# Patient Record
Sex: MALE | Race: BLACK OR AFRICAN AMERICAN | NOT HISPANIC OR LATINO | Employment: FULL TIME | URBAN - METROPOLITAN AREA
[De-identification: names, ages, dates, MRNs, and addresses within clinical notes are randomized per-mention and may not be internally consistent; named-entity substitution may affect disease eponyms.]

---

## 2017-01-31 ENCOUNTER — HOSPITAL ENCOUNTER (EMERGENCY)
Facility: HOSPITAL | Age: 27
Discharge: HOME | End: 2017-01-31
Attending: EMERGENCY MEDICINE
Payer: MEDICAID

## 2017-01-31 VITALS
SYSTOLIC BLOOD PRESSURE: 160 MMHG | OXYGEN SATURATION: 98 % | WEIGHT: 215 LBS | BODY MASS INDEX: 28.49 KG/M2 | DIASTOLIC BLOOD PRESSURE: 76 MMHG | TEMPERATURE: 98 F | RESPIRATION RATE: 18 BRPM | HEART RATE: 91 BPM | HEIGHT: 73 IN

## 2017-01-31 DIAGNOSIS — L02.821: Primary | ICD-10-CM

## 2017-01-31 PROCEDURE — 99283 EMERGENCY DEPT VISIT LOW MDM: CPT | Mod: 25

## 2017-01-31 PROCEDURE — 63600175 PHARM REV CODE 636 W HCPCS: Performed by: NURSE PRACTITIONER

## 2017-01-31 PROCEDURE — 96372 THER/PROPH/DIAG INJ SC/IM: CPT

## 2017-01-31 PROCEDURE — 10060 I&D ABSCESS SIMPLE/SINGLE: CPT

## 2017-01-31 PROCEDURE — 25000003 PHARM REV CODE 250: Performed by: NURSE PRACTITIONER

## 2017-01-31 RX ORDER — ONDANSETRON 4 MG/1
4 TABLET, ORALLY DISINTEGRATING ORAL
Status: COMPLETED | OUTPATIENT
Start: 2017-01-31 | End: 2017-01-31

## 2017-01-31 RX ORDER — LIDOCAINE HYDROCHLORIDE AND EPINEPHRINE 10; 10 MG/ML; UG/ML
10 INJECTION, SOLUTION INFILTRATION; PERINEURAL
Status: COMPLETED | OUTPATIENT
Start: 2017-01-31 | End: 2017-01-31

## 2017-01-31 RX ORDER — SULFAMETHOXAZOLE AND TRIMETHOPRIM 800; 160 MG/1; MG/1
1 TABLET ORAL
Status: COMPLETED | OUTPATIENT
Start: 2017-01-31 | End: 2017-01-31

## 2017-01-31 RX ORDER — HYDROMORPHONE HYDROCHLORIDE 2 MG/ML
1 INJECTION, SOLUTION INTRAMUSCULAR; INTRAVENOUS; SUBCUTANEOUS
Status: COMPLETED | OUTPATIENT
Start: 2017-01-31 | End: 2017-01-31

## 2017-01-31 RX ORDER — SULFAMETHOXAZOLE AND TRIMETHOPRIM 800; 160 MG/1; MG/1
1 TABLET ORAL 2 TIMES DAILY
Qty: 14 TABLET | Refills: 0 | Status: SHIPPED | OUTPATIENT
Start: 2017-01-31 | End: 2017-02-07

## 2017-01-31 RX ADMIN — ONDANSETRON 4 MG: 4 TABLET, ORALLY DISINTEGRATING ORAL at 06:01

## 2017-01-31 RX ADMIN — LIDOCAINE HYDROCHLORIDE,EPINEPHRINE BITARTRATE 10 ML: 10; .01 INJECTION, SOLUTION INFILTRATION; PERINEURAL at 06:01

## 2017-01-31 RX ADMIN — SULFAMETHOXAZOLE AND TRIMETHOPRIM 1 TABLET: 800; 160 TABLET ORAL at 06:01

## 2017-01-31 RX ADMIN — HYDROMORPHONE HYDROCHLORIDE 1 MG: 2 INJECTION INTRAMUSCULAR; INTRAVENOUS; SUBCUTANEOUS at 06:01

## 2017-02-01 NOTE — DISCHARGE INSTRUCTIONS
Please keep the area clean and dry.  You can use antibacterial soap and rinse with water. Keep a clean gauze dressing over the area.    Take bactrim for 7 days.    Follow up with your primary care doctor or return to the ER in 2-3 days for wound check.    Return for any new or worsening symptoms or concerns.

## 2017-02-01 NOTE — ED PROVIDER NOTES
"Encounter Date: 1/31/2017    SCRIBE #1 NOTE: I, Stanley Squires, am scribing for, and in the presence of,  Alecia Guerrero NP. I have scribed the following portions of the note - Other sections scribed: HPI, ROS.       History     Chief Complaint   Patient presents with    Abscess     Pt. c/o abscess that began as a small cut from a box that "hit him on the head" on Friday.      Review of patient's allergies indicates:   Allergen Reactions    Animal derived oil      Dander      Pollen extracts      HPI Comments: CC: Abscess    27 y/o male with Bronchitis, peptic ulcer, and shingles presents to the ED c/o 4 day hx of acute onset abscess to posterior neck with associated pus discharge and pain. Pain is severe (10/10) and exacerbates with palpation and movement of neck and shoulders. Symptoms have progressively worsened. Pt reports that 4 days ago he noticed a bump to his posterior neck. Then, when he went to work that same day, pt reports that a dirty box braised the bump. Pt states that he has "sensitive skin." Pt reports last tetanus immunization was within the last yr. Pt also c/o subjective fever and generalized body aches. Pt denies N/V/D, abdominal pain, or cough. No attempted treatment reported. No alleviating factors or other symptoms reported.     The history is provided by the patient. No  was used.     Past Medical History   Diagnosis Date    Bronchitis     Knee contusion     Peptic ulcer     Shingles     Shingles      Past Medical History Pertinent Negatives   Diagnosis Date Noted    Asthma 12/9/2013    Depression 12/9/2013    Hypertension 12/9/2013     Past Surgical History   Procedure Laterality Date    Circumcision, primary       Family History   Problem Relation Age of Onset    Cancer Maternal Grandfather     Diabetes Paternal Grandfather     No Known Problems Mother     No Known Problems Father     Lupus Maternal Aunt     Diabetes Maternal Uncle      Social History "   Substance Use Topics    Smoking status: Never Smoker    Smokeless tobacco: Never Used    Alcohol use No     Review of Systems   Constitutional: Positive for fever (subjective). Negative for chills.        (+) generalized body aches   HENT: Negative for rhinorrhea.    Eyes: Negative for redness.   Respiratory: Negative for cough and shortness of breath.    Cardiovascular: Negative for chest pain.   Gastrointestinal: Negative for abdominal pain, diarrhea, nausea and vomiting.   Genitourinary: Negative for difficulty urinating and dysuria.   Musculoskeletal: Negative for gait problem.   Skin: Negative for rash.        (+) abscess to posterior neck with associated pus discharge and pain   Neurological: Negative for headaches.       Physical Exam   Initial Vitals   BP Pulse Resp Temp SpO2   01/31/17 1650 01/31/17 1650 01/31/17 1650 01/31/17 1650 01/31/17 1650   174/87 98 17 99.6 °F (37.6 °C) 100 %     Physical Exam    Nursing note and vitals reviewed.  Constitutional: Vital signs are normal. He appears well-developed and well-nourished. He is not diaphoretic. He is cooperative. He does not appear ill. No distress.   HENT:   Head:       Eyes: EOM are normal. Pupils are equal, round, and reactive to light.   Pulmonary/Chest: No respiratory distress. He has no wheezes. He has no rhonchi. He has no rales.   Neurological: He is alert and oriented to person, place, and time.         ED Course   I & D - Incision and Drainage  Date/Time: 1/31/2017 8:10 PM  Performed by: DELVIS BONILLA  Authorized by: RADHA MONTANO   Type: cyst  Body area: head/neck  Location details: neck  Anesthesia: local infiltration    Anesthesia:  Anesthesia: local infiltration  Local Anesthetic: lidocaine 1% with epinephrine   Anesthetic total: 2 mL  Scalpel size: 11  Incision type: single straight  Complexity: simple  Drainage: pus  Drainage amount: moderate  Wound treatment: incision,  drainage,  expression of material and  removal of cyst  capsule  Patient tolerance: Patient tolerated the procedure well with no immediate complications        Labs Reviewed - No data to display             Additional MDM:   Comments: This is an urgent evaluation of a 26-year-old male that presents to the emergency room with small abscesses the back of his neck.  Exam reveals multiple carbuncles, or a furuncle, to the posterior neck. There are multiple pustular heads visible with some actively draining pus.  The wound was incised and drained with a copious amount of pus, caseous material, and a portion of cyst material expressed.  The patient is otherwise nontoxic appearing and afebrile.  No evidence of severe cellulitis or sepsis.  Workup not indicated today.  His tetanus up-to-date.  Will start patient on bactrim. To follow-up with PCP or return to the emergency room in 2-3 days for wound check.  Return precautions were given for any new or worsening symptoms or concerns.  Case discussed with attending, , and he is in agreement with plan. Stable for discharge and outpatient follow.  .          Scribe Attestation:   Scribe #1: I performed the above scribed service and the documentation accurately describes the services I performed. I attest to the accuracy of the note.    Attending Attestation:     Physician Attestation Statement for NP/PA:   I discussed this assessment and plan of this patient with the NP/PA, but I did not personally examine the patient. The face to face encounter was performed by the NP/PA.    Other NP/PA Attestation Additions:      Medical Decision Makin-year-old male presenting with abscess to the back of the neck.  I&D performed.  I agree with plan.       Physician Attestation for Scribe:  Physician Attestation Statement for Scribe #1: I, Alecia Guerrero NP, reviewed documentation, as scribed by Stanley Squires in my presence, and it is both accurate and complete.                 ED Course     Clinical Impression:   The encounter diagnosis  was Furuncle of occipital region of scalp.    Disposition:   Disposition: Discharged  Condition: Stable       Alecia Winters NP  01/31/17 2016       Panda Umaña MD  01/31/17 2028

## 2017-03-24 ENCOUNTER — NURSE TRIAGE (OUTPATIENT)
Dept: ADMINISTRATIVE | Facility: CLINIC | Age: 27
End: 2017-03-24

## 2017-03-24 NOTE — TELEPHONE ENCOUNTER
Reason for Disposition   Message left on identified answering machine.    Protocols used: ST NO CONTACT OR DUPLICATE CONTACT CALL-A-AH  call returned --no answer/ left VM/ # verified in EPIC    Meri Agudelo RN

## 2017-04-06 ENCOUNTER — PATIENT MESSAGE (OUTPATIENT)
Dept: WOUND CARE | Facility: HOSPITAL | Age: 27
End: 2017-04-06

## 2017-04-06 DIAGNOSIS — L73.1 PSEUDOFOLLICULITIS BARBAE: ICD-10-CM

## 2017-04-06 RX ORDER — HYDROCORTISONE 25 MG/G
OINTMENT TOPICAL DAILY PRN
Qty: 20 G | Refills: 1 | Status: SHIPPED | OUTPATIENT
Start: 2017-04-06 | End: 2017-09-23 | Stop reason: SDUPTHER

## 2017-04-13 ENCOUNTER — HOSPITAL ENCOUNTER (EMERGENCY)
Facility: HOSPITAL | Age: 27
Discharge: HOME | End: 2017-04-13
Attending: EMERGENCY MEDICINE
Payer: MEDICAID

## 2017-04-13 VITALS
BODY MASS INDEX: 29.82 KG/M2 | TEMPERATURE: 99 F | DIASTOLIC BLOOD PRESSURE: 88 MMHG | HEART RATE: 84 BPM | SYSTOLIC BLOOD PRESSURE: 132 MMHG | RESPIRATION RATE: 20 BRPM | HEIGHT: 73 IN | OXYGEN SATURATION: 97 % | WEIGHT: 225 LBS

## 2017-04-13 DIAGNOSIS — M54.50 ACUTE LEFT-SIDED LOW BACK PAIN WITHOUT SCIATICA: Primary | ICD-10-CM

## 2017-04-13 DIAGNOSIS — L73.0 ACNE KELOIDALIS NUCHAE: ICD-10-CM

## 2017-04-13 PROCEDURE — 25000003 PHARM REV CODE 250: Performed by: EMERGENCY MEDICINE

## 2017-04-13 PROCEDURE — 99283 EMERGENCY DEPT VISIT LOW MDM: CPT

## 2017-04-13 RX ORDER — CYCLOBENZAPRINE HCL 10 MG
10 TABLET ORAL 3 TIMES DAILY PRN
Qty: 15 TABLET | Refills: 0 | Status: SHIPPED | OUTPATIENT
Start: 2017-04-13 | End: 2017-04-18

## 2017-04-13 RX ORDER — CYCLOBENZAPRINE HCL 10 MG
10 TABLET ORAL
Status: COMPLETED | OUTPATIENT
Start: 2017-04-13 | End: 2017-04-13

## 2017-04-13 RX ADMIN — CYCLOBENZAPRINE HYDROCHLORIDE 10 MG: 10 TABLET, FILM COATED ORAL at 03:04
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
## 2017-04-13 NOTE — ED AVS SNAPSHOT
[facility]  [place] [address]               Greg Castellanos   2017  2:11 PM   ED    Description:  Male : 1990   Department:  [facility]           Your Care was Coordinated By:     Provider Role From To    Tyrell Raygoza MD Attending Provider 17 1417 --      Reason for Visit     Neck Pain           Diagnoses this Visit        Comments    Acute left-sided low back pain without sciatica    -  Primary     Acne keloidalis nuchae           ED Disposition     ED Disposition Condition Comment    Discharge  Our goal in the emergency department is to always give you outstanding care and exceptional service. You may receive a survey by mail or e-mail in the next week regarding your experience in our ED. We would greatly appreciate your completing and returnin g the survey. Your feedback provides us with a way to recognize our staff who give very good care and it helps us learn how to improve when your experience was below our aspiration of excellence.              To Do List           Follow-up Information     Follow up with Lai St MD.    Specialty:  Internal Medicine    Why:  As needed    Contact information:    [address]  [place] [id number] [phone]          Follow up with [facility].    Specialty:  Emergency Medicine    Why:  If symptoms worsen    Contact information:    [address] [place] [address] [phone]        Schedule an appointment as soon as possible for a visit with Flakito Ramirez MD.    Specialty:  Dermatology    Contact information:    [address]  [address]  [facility] [address] [phone]         These Medications        Disp Refills Start End    cyclobenzaprine (FLEXERIL) 10 MG tablet 15 tablet 0 2017    Take 1 tablet (10 mg total) by mouth 3 (three) times daily as needed for Muscle spasms. - Oral    Pharmacy:  "[facility] [id number] - [address] [facility] AT [facility] #: [phone]         [facility]     [facility] - 24/7 Assistance  Unless otherwise directed by your provider, please contact [facility], our nurse care line that is available for 24/7 assistance.     Registered nurses in the [facility] provide: appointment scheduling, clinical advisement, health education, and other advisory services.  Call: [phone] (toll free)               Medications           Message regarding Medications     Verify the changes and/or additions to your medication regime listed below are the same as discussed with your clinician today.  If any of these changes or additions are incorrect, please notify your healthcare provider.        START taking these NEW medications        Refills    cyclobenzaprine (FLEXERIL) 10 MG tablet 0    Sig: Take 1 tablet (10 mg total) by mouth 3 (three) times daily as needed for Muscle spasms.    Class: Print    Route: Oral           Verify that the below list of medications is an accurate representation of the medications you are currently taking.  If none reported, the list may be blank. If incorrect, please contact your healthcare provider. Carry this list with you in case of emergency.           Current Medications     cyclobenzaprine (FLEXERIL) 10 MG tablet Take 1 tablet (10 mg total) by mouth 3 (three) times daily as needed for Muscle spasms.    hydrocortisone 2.5 % ointment Apply topically daily as needed.           Clinical Reference Information           Your Vitals Were     BP Pulse Temp Resp Height Weight    150/87 (BP Location: Left arm, Patient Position: Sitting) 76 98.9 °F (37.2 °C) (Oral) 18 6' 1" (1.854 m) 102.1 kg (225 lb)    SpO2 BMI             97% 29.69 kg/m2         Allergies as of 4/13/2017        Reactions    Animal Derived Oil     Dander    Pollen Extracts       Immunizations Administered on Date of Encounter " - 4/13/2017     None      ED Micro, Lab, POCT     None      ED Imaging Orders     None        Discharge Instructions         Back Pain (Acute or Chronic)    Back pain is one of the most common problems. The good news is that most people feel better in 1 to 2 weeks, and most of the rest in 1 to 2 months. Most people can remain active.  People experience and describe pain differently; not everyone is the same.  · The pain can be sharp, stabbing, shooting, aching, cramping or burning.  · Movement, standing, bending, lifting, sitting, or walking may worsen pain.  · It can be localized to one spot or area, or it can be more generalized.  · It can spread or radiate upwards, to the front, or go down your arms or legs (sciatica).  · It can cause muscle spasm.  Most of the time, mechanical problems with the muscles or spine cause the pain. Mechanical problems are usually caused by an injury to the muscles or ligaments. While illness can cause back pain, it is usually not caused by a serious illness. Mechanical problems include:   · Physical activity such as sports, exercise, work, or normal activity  · Overexertion, lifting, pushing, pulling incorrectly or too aggressively  · Sudden twisting, bending, or stretching from an accident, or accidental movement  · Poor posture  · Stretching or moving wrong, without noticing pain at the time  · Poor coordination, lack of regular exercise (check with your doctor about this)  · Spinal disc disease or arthritis  · Stress  Pain can also be related to pregnancy, or illness like appendicitis, bladder or kidney infections, pelvic infections, and many other things.  Acute back pain usually gets better in 1 to 2 weeks. Back pain related to disk disease, arthritis in the spinal joints or spinal stenosis (narrowing of the spinal canal) can become chronic and last for months or years.  Unless you had a physical injury (for example, a car accident or fall) X-rays are usually not needed for the  initial evaluation of back pain. If pain continues and does not respond to medical treatment, X-rays and other tests may be needed.  Home care  Try these home care recommendations:  · When in bed, try to find a position of comfort. A firm mattress is best. Try lying flat on your back with pillows under your knees. You can also try lying on your side with your knees bent up towards your chest and a pillow between your knees.  · At first, do not try to stretch out the sore spots. If there is a strain, it is not like the good soreness you get after exercising without an injury. In this case, stretching may make it worse.  · Avoid prolong sitting, long car rides, or travel. This puts more stress on the lower back than standing or walking.  · During the first 24 to 72 hours after an acute injury or flare up of chronic back pain, apply an ice pack to the painful area for 20 minutes and then remove it for 20 minutes. Do this over a period of 60 to 90 minutes or several times a day. This will reduce swelling and pain. Wrap the ice pack in a thin towel or plastic to protect your skin.  · You can start with ice, then switch to heat. Heat (hot shower, hot bath, or heating pad) reduces pain and works well for muscle spasms. Heat can be applied to the painful area for 20 minutes then remove it for 20 minutes. Do this over a period of 60 to 90 minutes or several times a day. Do not sleep on a heating pad. It can lead to skin burns or tissue damage.  · You can alternate ice and heat therapy. Talk with your doctor about the best treatment for your back pain.  · Therapeutic massage can help relax the back muscles without stretching them.  · Be aware of safe lifting methods and do not lift anything without stretching first.  Medicines  Talk to your doctor before using medicine, especially if you have other medical problems or are taking other medicines.  · You may use over-the-counter medicine as directed on the bottle to control  pain, unless another pain medicine was prescribed. If you have chronic conditions like diabetes, liver or kidney disease, stomach ulcers, or gastrointestinal bleeding, or are taking blood thinners, talk to your doctor before taking any medicine.  · Be careful if you are given a prescription medicines, narcotics, or medicine for muscle spasms. They can cause drowsiness, affect your coordination, reflexes, and judgement. Do not drive or operate heavy machinery.  Follow-up care  Follow up with your healthcare provider, or as advised.   A radiologist will review any X-rays that were taken. Your provide will notify you of any new findings that may affect your care.  Call 911  Call emergency services if any of the following occur:  · Trouble breathing  · Confusion  · Very drowsy or trouble awakening  · Fainting or loss of consciousness  · Rapid or very slow heart rate  · Loss of bowel or bladder control  When to seek medical advice  Call your healthcare provider right away if any of these occur:   · Pain becomes worse or spreads to your legs  · Weakness or numbness in one or both legs  · Numbness in the groin or genital area  Date Last Reviewed: 7/1/2016  © 5169-4349 [employer]. [address] [place] [address]. All rights reserved. This information is not intended as a substitute for professional medical care. Always follow your healthcare professional's instructions.          Exercises to Strengthen Your Lower Back  Strong lower back and abdominal muscles work together to support your spine. The exercises below will help strengthen the lower back. It is important that you begin exercising slowly and increase levels gradually.  Always begin any exercise program with stretching. If you feel pain while doing any of these exercises, stop and talk to your doctor about a more specific exercise program that better suits your condition.   Low back stretch  The point of stretching is to make you more  flexible and increase your range of motion. Stretch only as much as you are able. Stretch slowly. Do not push your stretch to the limit. If at any point you feel pain while stretching, this is your (temporary) limit.  · Lie on your back with your knees bent and both feet on the ground.  · Slowly raise your left knee to your chest as you flatten your lower back against the floor. Hold for 5 seconds.  · Relax and repeat the exercise with your right knee.  · Do 10 of these exercises for each leg.  · Repeat hugging both knees to your chest at the same time.  Building lower back strength  Start your exercise routine with 10 to 30 minutes a day, 1 to 3 times a day.  Initial exercises  Lying on your back:  1. Ankle pumps: Move your foot up and down, towards your head, and then away. Repeat 10 times with each foot.  2. Heel slides: Slowly bend your knee, drawing the heel of your foot towards you. Then slide your heel/foot from you, straightening your knee. Do not lift your foot off the floor (this is not a leg lift).  3. Abdominal contraction: Bend your knees and put your hands on your stomach. Tighten your stomach muscles. Hold for 5 seconds, then relax. Repeat 10 times.  4. Straight leg raise: Bend one leg at the knee and keep the other leg straight. Tighten your stomach muscles. Slowly lift your straight leg 6 to 12 inches off the floor and hold for up to 5 seconds. Repeat 10 times on each side.  Standin. Wall squats: Stand with your back against the wall. Move your feet about 12 inches away from the wall. Tighten your stomach muscles, and slowly bend your knees until they are at about a 45 degree angle. Do not go down too far. Hold about 5 seconds. Then slowly return to your starting position. Repeat 10 times.  2. Heel raises: Stand facing the wall. Slowly raise the heels of your feet up and down, while keeping your toes on the floor. If you have trouble balancing, you can touch the wall with your hands. Repeat 10  times.  More advanced exercises  When you feel comfortable enough, try these exercises.  1. Kneeling lumbar extension: Begin on your hands and knees. At the same time, raise and straighten your right arm and left leg until they are parallel to the ground. Hold for 2 seconds and come back slowly to a starting position. Repeat with left arm and right leg, alternating 10 times.  2. Prone lumbar extension: Lie face down, arms extended overhead, palms on the floor. At the same time, raise your right arm and left leg as high as comfortably possible. Hold for 10 seconds and slowly return to start. Repeat with left arm and right leg, alternating 10 times. Gradually build up to 20 times. (Advanced: Repeat this exercise raising both arms and both legs a few inches off the floor at the same time. Hold for 5 seconds and release.)  3. Pelvic tilt: Lie on the floor on your back with your knees bent at 90 degrees. Your feet should be flat on the floor. Inhale, exhale, then slowly contract your abdominal muscles bringing your navel toward your spine. Let your pelvis rock back until your lower back is flat on the floor. Hold for 10 seconds while breathing smoothly.  4. Abdominal crunch: Perform a pelvic tilt (above) flattening your lower back against the floor. Holding the tension in your abdominal muscles, take another breath and raise your shoulder blades off the ground (this is not a full sit-up). Keep your head in line with your body (dont bend your neck forward). Hold for 2 seconds, then slowly lower.  Date Last Reviewed: 6/1/2016 © 2000-2016 [employer] [address] [place] [address], [place] [address]. All rights reserved. This information is not intended as a substitute for professional medical care. Always follow your healthcare professional's instructions.          Back Care Tips    Caring for your back  These are things you can do to prevent a recurrence of acute back pain and to reduce symptoms from chronic back  pain:  · Maintain a healthy weight. If you are overweight, losing weight will help most types of back pain.  · Exercise is an important part of recovery from most types of back pain. The muscles behind and in front of the spine support the back. This means strengthening both the back muscles and the abdominal muscles will provide better support for your spine.   · Swimming and brisk walking are good overall exercises to improve your fitness level.  · Practice safe lifting methods (below).  · Practice good posture when sitting, standing and walking. Avoid prolonged sitting. This puts more stress on the lower back than standing or walking.  · Wear quality shoes with sufficient arch support. Foot and ankle alignment can affect back symptoms. Women should avoid wearing high heels.  · Therapeutic massage can help relax the back muscles without stretching them.  · During the first 24 to 72 hours after an acute injury or flare-up of chronic back pain, apply an ice pack to the painful area for 20 minutes and then remove it for 20 minutes, over a period of 60 to 90 minutes, or several times a day. As a safety precaution, do not use a heating pad at bedtime. Sleeping on a heating pad can lead to skin burns or tissue damage.  · You can alternate ice and heat therapies.  Medications  Talk to your healthcare provider before using medicines, especially if you have other medical problems or are taking other medicines.  · You may use acetaminophen or ibuprofen to control pain, unless your healthcare provider prescribed other pain medicine. If you have chronic conditions like diabetes, liver or kidney disease, stomach ulcers, or gastrointestinal bleeding, or are taking blood thinners, talk with your healthcare provider before taking any medicines.  · Be careful if you are given prescription pain medicines, narcotics, or medicine for muscle spasm. They can cause drowsiness, affect your coordination, reflexes, and judgment. Do not  drive or operate heavy machinery while taking these types of medicines. Take prescription pain medicine only as prescribed by your healthcare provider.  Lumbar stretch  Here is a simple stretching exercise that will help relax muscle spasm and keep your back more limber. If exercise makes your back pain worse, dont do it.  · Lie on your back with your knees bent and both feet on the ground.  · Slowly raise your left knee to your chest as you flatten your lower back against the floor. Hold for 5 seconds.  · Relax and repeat the exercise with your right knee.  · Do 10 of these exercises for each leg.  Safe lifting method  · Dont bend over at the waist to lift an object off the floor.  Instead, bend your knees and hips in a squat.   · Keep your back and head upright  · Hold the object close to your body, directly in front of you.  · Straighten your legs to lift the object.   · Lower the object to the floor in the reverse fashion.  · If you must slide something across the floor, push it.  Posture tips  Sitting  Sit in chairs with straight backs or low-back support. Keep your knees lower than your hips, with your feet flat on the floor.  When driving, sit up straight. Adjust the seat forward so you are not leaning toward the steering wheel.  A small pillow or rolled towel behind your lower back may help if you are driving long distances.   Standing  When standing for long periods, shift most of your weight to one leg at a time. Alternate legs every few minutes.   Sleeping  The best way to sleep is on your side with your knees bent. Put a low pillow under your head to support your neck in a neutral spine position. Avoid thick pillows that bend your neck to one side. Put a pillow between your legs to further relax your lower back. If you sleep on your back, put pillows under your knees to support your legs in a slightly flexed position. Use a firm mattress. If your mattress sags, replace it, or use a 1/2-inch plywood  board under the mattress to add support.  Follow-up care  Follow up with your healthcare provider, or as advised.  If X-rays, a CT scan or an MRI scan were taken, they will be reviewed by a radiologist. You will be notified of any new findings that may affect your care.  Call 911  Seek emergency medical care if any of the following occur:  · Trouble breathing  · Confusion  · Very drowsy  · Fainting or loss of consciousness  · Rapid or very slow heart rate  · Loss of  bowel or bladder control  When to seek medical care  Call your healthcare provider if any of the following occur:  · Pain becomes worse or spreads to your arms or legs  · Weakness or numbness in one or both arms or legs  · Numbness in the groin area  Date Last Reviewed: 6/1/2016 © 2000-2016 [employer]. [address] [place] [address]. All rights reserved. This information is not intended as a substitute for professional medical care. Always follow your healthcare professional's instructions.           [facility] complies with applicable Federal civil rights laws and does not discriminate on the basis of race, color, national origin, age, disability, or sex.        Language Assistance Services     ATTENTION: Language assistance services are available, free of charge. Please call [phone].      ATENCIÓN: Si habla español, tiene a rosales disposición servicios gratuitos de asistencia lingüística. Llame al [phone].     CHÚ Ý: N?u b?n nói Ti?ng Vi?t, có các d?ch v? h? tr? ngôn ng? mi?n phí dành cho b?n. G?i s? [phone].

## 2017-04-13 NOTE — DISCHARGE INSTRUCTIONS
Back Pain (Acute or Chronic)    Back pain is one of the most common problems. The good news is that most people feel better in 1 to 2 weeks, and most of the rest in 1 to 2 months. Most people can remain active.  People experience and describe pain differently; not everyone is the same.  · The pain can be sharp, stabbing, shooting, aching, cramping or burning.  · Movement, standing, bending, lifting, sitting, or walking may worsen pain.  · It can be localized to one spot or area, or it can be more generalized.  · It can spread or radiate upwards, to the front, or go down your arms or legs (sciatica).  · It can cause muscle spasm.  Most of the time, mechanical problems with the muscles or spine cause the pain. Mechanical problems are usually caused by an injury to the muscles or ligaments. While illness can cause back pain, it is usually not caused by a serious illness. Mechanical problems include:   · Physical activity such as sports, exercise, work, or normal activity  · Overexertion, lifting, pushing, pulling incorrectly or too aggressively  · Sudden twisting, bending, or stretching from an accident, or accidental movement  · Poor posture  · Stretching or moving wrong, without noticing pain at the time  · Poor coordination, lack of regular exercise (check with your doctor about this)  · Spinal disc disease or arthritis  · Stress  Pain can also be related to pregnancy, or illness like appendicitis, bladder or kidney infections, pelvic infections, and many other things.  Acute back pain usually gets better in 1 to 2 weeks. Back pain related to disk disease, arthritis in the spinal joints or spinal stenosis (narrowing of the spinal canal) can become chronic and last for months or years.  Unless you had a physical injury (for example, a car accident or fall) X-rays are usually not needed for the initial evaluation of back pain. If pain continues and does not respond to medical treatment, X-rays and other tests may be  needed.  Home care  Try these home care recommendations:  · When in bed, try to find a position of comfort. A firm mattress is best. Try lying flat on your back with pillows under your knees. You can also try lying on your side with your knees bent up towards your chest and a pillow between your knees.  · At first, do not try to stretch out the sore spots. If there is a strain, it is not like the good soreness you get after exercising without an injury. In this case, stretching may make it worse.  · Avoid prolong sitting, long car rides, or travel. This puts more stress on the lower back than standing or walking.  · During the first 24 to 72 hours after an acute injury or flare up of chronic back pain, apply an ice pack to the painful area for 20 minutes and then remove it for 20 minutes. Do this over a period of 60 to 90 minutes or several times a day. This will reduce swelling and pain. Wrap the ice pack in a thin towel or plastic to protect your skin.  · You can start with ice, then switch to heat. Heat (hot shower, hot bath, or heating pad) reduces pain and works well for muscle spasms. Heat can be applied to the painful area for 20 minutes then remove it for 20 minutes. Do this over a period of 60 to 90 minutes or several times a day. Do not sleep on a heating pad. It can lead to skin burns or tissue damage.  · You can alternate ice and heat therapy. Talk with your doctor about the best treatment for your back pain.  · Therapeutic massage can help relax the back muscles without stretching them.  · Be aware of safe lifting methods and do not lift anything without stretching first.  Medicines  Talk to your doctor before using medicine, especially if you have other medical problems or are taking other medicines.  · You may use over-the-counter medicine as directed on the bottle to control pain, unless another pain medicine was prescribed. If you have chronic conditions like diabetes, liver or kidney disease,  stomach ulcers, or gastrointestinal bleeding, or are taking blood thinners, talk to your doctor before taking any medicine.  · Be careful if you are given a prescription medicines, narcotics, or medicine for muscle spasms. They can cause drowsiness, affect your coordination, reflexes, and judgement. Do not drive or operate heavy machinery.  Follow-up care  Follow up with your healthcare provider, or as advised.   A radiologist will review any X-rays that were taken. Your provide will notify you of any new findings that may affect your care.  Call 911  Call emergency services if any of the following occur:  · Trouble breathing  · Confusion  · Very drowsy or trouble awakening  · Fainting or loss of consciousness  · Rapid or very slow heart rate  · Loss of bowel or bladder control  When to seek medical advice  Call your healthcare provider right away if any of these occur:   · Pain becomes worse or spreads to your legs  · Weakness or numbness in one or both legs  · Numbness in the groin or genital area  Date Last Reviewed: 7/1/2016 © 2000-2016 [employer]. [address] [place] [address]. All rights reserved. This information is not intended as a substitute for professional medical care. Always follow your healthcare professional's instructions.          Exercises to Strengthen Your Lower Back  Strong lower back and abdominal muscles work together to support your spine. The exercises below will help strengthen the lower back. It is important that you begin exercising slowly and increase levels gradually.  Always begin any exercise program with stretching. If you feel pain while doing any of these exercises, stop and talk to your doctor about a more specific exercise program that better suits your condition.   Low back stretch  The point of stretching is to make you more flexible and increase your range of motion. Stretch only as much as you are able. Stretch slowly. Do not push your stretch to  the limit. If at any point you feel pain while stretching, this is your (temporary) limit.  · Lie on your back with your knees bent and both feet on the ground.  · Slowly raise your left knee to your chest as you flatten your lower back against the floor. Hold for 5 seconds.  · Relax and repeat the exercise with your right knee.  · Do 10 of these exercises for each leg.  · Repeat hugging both knees to your chest at the same time.  Building lower back strength  Start your exercise routine with 10 to 30 minutes a day, 1 to 3 times a day.  Initial exercises  Lying on your back:  1. Ankle pumps: Move your foot up and down, towards your head, and then away. Repeat 10 times with each foot.  2. Heel slides: Slowly bend your knee, drawing the heel of your foot towards you. Then slide your heel/foot from you, straightening your knee. Do not lift your foot off the floor (this is not a leg lift).  3. Abdominal contraction: Bend your knees and put your hands on your stomach. Tighten your stomach muscles. Hold for 5 seconds, then relax. Repeat 10 times.  4. Straight leg raise: Bend one leg at the knee and keep the other leg straight. Tighten your stomach muscles. Slowly lift your straight leg 6 to 12 inches off the floor and hold for up to 5 seconds. Repeat 10 times on each side.  Standin. Wall squats: Stand with your back against the wall. Move your feet about 12 inches away from the wall. Tighten your stomach muscles, and slowly bend your knees until they are at about a 45 degree angle. Do not go down too far. Hold about 5 seconds. Then slowly return to your starting position. Repeat 10 times.  2. Heel raises: Stand facing the wall. Slowly raise the heels of your feet up and down, while keeping your toes on the floor. If you have trouble balancing, you can touch the wall with your hands. Repeat 10 times.  More advanced exercises  When you feel comfortable enough, try these exercises.  1. Kneeling lumbar extension: Begin  on your hands and knees. At the same time, raise and straighten your right arm and left leg until they are parallel to the ground. Hold for 2 seconds and come back slowly to a starting position. Repeat with left arm and right leg, alternating 10 times.  2. Prone lumbar extension: Lie face down, arms extended overhead, palms on the floor. At the same time, raise your right arm and left leg as high as comfortably possible. Hold for 10 seconds and slowly return to start. Repeat with left arm and right leg, alternating 10 times. Gradually build up to 20 times. (Advanced: Repeat this exercise raising both arms and both legs a few inches off the floor at the same time. Hold for 5 seconds and release.)  3. Pelvic tilt: Lie on the floor on your back with your knees bent at 90 degrees. Your feet should be flat on the floor. Inhale, exhale, then slowly contract your abdominal muscles bringing your navel toward your spine. Let your pelvis rock back until your lower back is flat on the floor. Hold for 10 seconds while breathing smoothly.  4. Abdominal crunch: Perform a pelvic tilt (above) flattening your lower back against the floor. Holding the tension in your abdominal muscles, take another breath and raise your shoulder blades off the ground (this is not a full sit-up). Keep your head in line with your body (dont bend your neck forward). Hold for 2 seconds, then slowly lower.  Date Last Reviewed: 6/1/2016 © 2000-2016 [employer]. [address] [place] [address]. All rights reserved. This information is not intended as a substitute for professional medical care. Always follow your healthcare professional's instructions.          Back Care Tips    Caring for your back  These are things you can do to prevent a recurrence of acute back pain and to reduce symptoms from chronic back pain:  · Maintain a healthy weight. If you are overweight, losing weight will help most types of back pain.  · Exercise is  an important part of recovery from most types of back pain. The muscles behind and in front of the spine support the back. This means strengthening both the back muscles and the abdominal muscles will provide better support for your spine.   · Swimming and brisk walking are good overall exercises to improve your fitness level.  · Practice safe lifting methods (below).  · Practice good posture when sitting, standing and walking. Avoid prolonged sitting. This puts more stress on the lower back than standing or walking.  · Wear quality shoes with sufficient arch support. Foot and ankle alignment can affect back symptoms. Women should avoid wearing high heels.  · Therapeutic massage can help relax the back muscles without stretching them.  · During the first 24 to 72 hours after an acute injury or flare-up of chronic back pain, apply an ice pack to the painful area for 20 minutes and then remove it for 20 minutes, over a period of 60 to 90 minutes, or several times a day. As a safety precaution, do not use a heating pad at bedtime. Sleeping on a heating pad can lead to skin burns or tissue damage.  · You can alternate ice and heat therapies.  Medications  Talk to your healthcare provider before using medicines, especially if you have other medical problems or are taking other medicines.  · You may use acetaminophen or ibuprofen to control pain, unless your healthcare provider prescribed other pain medicine. If you have chronic conditions like diabetes, liver or kidney disease, stomach ulcers, or gastrointestinal bleeding, or are taking blood thinners, talk with your healthcare provider before taking any medicines.  · Be careful if you are given prescription pain medicines, narcotics, or medicine for muscle spasm. They can cause drowsiness, affect your coordination, reflexes, and judgment. Do not drive or operate heavy machinery while taking these types of medicines. Take prescription pain medicine only as prescribed by  your healthcare provider.  Lumbar stretch  Here is a simple stretching exercise that will help relax muscle spasm and keep your back more limber. If exercise makes your back pain worse, dont do it.  · Lie on your back with your knees bent and both feet on the ground.  · Slowly raise your left knee to your chest as you flatten your lower back against the floor. Hold for 5 seconds.  · Relax and repeat the exercise with your right knee.  · Do 10 of these exercises for each leg.  Safe lifting method  · Dont bend over at the waist to lift an object off the floor.  Instead, bend your knees and hips in a squat.   · Keep your back and head upright  · Hold the object close to your body, directly in front of you.  · Straighten your legs to lift the object.   · Lower the object to the floor in the reverse fashion.  · If you must slide something across the floor, push it.  Posture tips  Sitting  Sit in chairs with straight backs or low-back support. Keep your knees lower than your hips, with your feet flat on the floor.  When driving, sit up straight. Adjust the seat forward so you are not leaning toward the steering wheel.  A small pillow or rolled towel behind your lower back may help if you are driving long distances.   Standing  When standing for long periods, shift most of your weight to one leg at a time. Alternate legs every few minutes.   Sleeping  The best way to sleep is on your side with your knees bent. Put a low pillow under your head to support your neck in a neutral spine position. Avoid thick pillows that bend your neck to one side. Put a pillow between your legs to further relax your lower back. If you sleep on your back, put pillows under your knees to support your legs in a slightly flexed position. Use a firm mattress. If your mattress sags, replace it, or use a 1/2-inch plywood board under the mattress to add support.  Follow-up care  Follow up with your healthcare provider, or as advised.  If X-rays, a  CT scan or an MRI scan were taken, they will be reviewed by a radiologist. You will be notified of any new findings that may affect your care.  Call 911  Seek emergency medical care if any of the following occur:  · Trouble breathing  · Confusion  · Very drowsy  · Fainting or loss of consciousness  · Rapid or very slow heart rate  · Loss of  bowel or bladder control  When to seek medical care  Call your healthcare provider if any of the following occur:  · Pain becomes worse or spreads to your arms or legs  · Weakness or numbness in one or both arms or legs  · Numbness in the groin area  Date Last Reviewed: 6/1/2016  © 7923-6679 [employer]. [address] [place] [address], [place] [address]. All rights reserved. This information is not intended as a substitute for professional medical care. Always follow your healthcare professional's instructions.

## 2017-04-21 ENCOUNTER — OFFICE VISIT (OUTPATIENT)
Dept: FAMILY MEDICINE | Facility: CLINIC | Age: 27
End: 2017-04-21
Payer: MEDICAID

## 2017-04-21 VITALS
HEIGHT: 72 IN | TEMPERATURE: 98 F | RESPIRATION RATE: 20 BRPM | OXYGEN SATURATION: 98 % | SYSTOLIC BLOOD PRESSURE: 138 MMHG | WEIGHT: 205 LBS | DIASTOLIC BLOOD PRESSURE: 84 MMHG | HEART RATE: 70 BPM | BODY MASS INDEX: 27.77 KG/M2

## 2017-04-21 DIAGNOSIS — R21 RASH: Primary | ICD-10-CM

## 2017-04-21 DIAGNOSIS — G47.30 SLEEP APNEA, UNSPECIFIED TYPE: ICD-10-CM

## 2017-04-21 DIAGNOSIS — M54.50 CHRONIC BILATERAL LOW BACK PAIN WITHOUT SCIATICA: ICD-10-CM

## 2017-04-21 DIAGNOSIS — G89.29 CHRONIC BILATERAL LOW BACK PAIN WITHOUT SCIATICA: ICD-10-CM

## 2017-04-21 PROCEDURE — 99214 OFFICE O/P EST MOD 30 MIN: CPT | Mod: PBBFAC,PN | Performed by: FAMILY MEDICINE

## 2017-04-21 PROCEDURE — 99214 OFFICE O/P EST MOD 30 MIN: CPT | Mod: S$PBB,,, | Performed by: FAMILY MEDICINE

## 2017-04-21 PROCEDURE — 99999 PR PBB SHADOW E&M-EST. PATIENT-LVL IV: CPT | Mod: PBBFAC,,, | Performed by: FAMILY MEDICINE

## 2017-04-21 RX ORDER — METHYLPREDNISOLONE 4 MG/1
TABLET ORAL
Qty: 1 PACKAGE | Refills: 0 | Status: SHIPPED | OUTPATIENT
Start: 2017-04-21 | End: 2017-09-18

## 2017-04-21 RX ORDER — TIZANIDINE 4 MG/1
4 TABLET ORAL EVERY 6 HOURS PRN
Qty: 30 TABLET | Refills: 0 | Status: SHIPPED | OUTPATIENT
Start: 2017-04-21 | End: 2017-05-01

## 2017-04-21 NOTE — MR AVS SNAPSHOT
[facility]  [address]  [place] [address]  Phone: [phone]                  Greg Castellanos   2017 3:15 PM   Office Visit    Description:  Male : 1990   Provider:  Deejay Toribio MD   Department:  [facility]           Reason for Visit     Back Pain     Lesions           Diagnoses this Visit        Comments    Rash    -  Primary     Chronic bilateral low back pain without sciatica                To Do List           Goals (5 Years of Data)     None      Follow-Up and Disposition     Return if symptoms worsen or fail to improve.       These Medications        Disp Refills Start End    methylPREDNISolone (MEDROL DOSEPACK) 4 mg tablet 1 Package 0 2017     use as directed    [facility]: [facility] [address] [place] [address] Ph #: [phone]       tizanidine (ZANAFLEX) 4 MG tablet 30 tablet 0 2017    Take 1 tablet (4 mg total) by mouth every 6 (six) hours as needed. - Oral    Pharmacy: [facility] [address] Ph #: [phone]         [facility]     [facility] Nurse Care Line -  Assistance  Unless otherwise directed by your provider, please contact [facility], our nurse care line that is available for  assistance.     Registered nurses in the [facility] provide: appointment scheduling, clinical advisement, health education, and other advisory services.  Call: [phone] (toll free)               Medications           Message regarding Medications     Verify the changes and/or additions to your medication regime listed below are the same as discussed with your clinician today.  If any of these changes or additions are incorrect, please notify your healthcare provider.        START taking these NEW medications        Refills    methylPREDNISolone (MEDROL DOSEPACK) 4 mg tablet 0    Sig: use  as directed    Class: Normal    tizanidine (ZANAFLEX) 4 MG tablet 0    Sig: Take 1 tablet (4 mg total) by mouth every 6 (six) hours as needed.    Class: Normal    Route: Oral           Verify that the below list of medications is an accurate representation of the medications you are currently taking.  If none reported, the list may be blank. If incorrect, please contact your healthcare provider. Carry this list with you in case of emergency.           Current Medications     hydrocortisone 2.5 % ointment Apply topically daily as needed.    methylPREDNISolone (MEDROL DOSEPACK) 4 mg tablet use as directed    tizanidine (ZANAFLEX) 4 MG tablet Take 1 tablet (4 mg total) by mouth every 6 (six) hours as needed.           Clinical Reference Information           Your Vitals Were     BP Pulse Temp Resp Height Weight    138/84 (BP Location: Left arm, Patient Position: Sitting, BP Method: Manual) 70 98.1 °F (36.7 °C) (Oral) 20 6' (1.829 m) 93 kg (205 lb 0.4 oz)    SpO2 BMI             98% 27.81 kg/m2         Blood Pressure          Most Recent Value    BP  138/84      Allergies as of 4/21/2017     Animal Derived Oil    Pollen Extracts      Immunizations Administered on Date of Encounter - 4/21/2017     None      Orders Placed During Today's Visit      Normal Orders This Visit    Ambulatory referral to Dermatology       Language Assistance Services     ATTENTION: Language assistance services are available, free of charge. Please call [phone].      ATENCIÓN: Si habla wendy, tiene a rosales disposición servicios gratuitos de asistencia lingüística. Llame al [phone].     [facility] Ý: N?u b?n nói Ti?ng Vi?t, có các d?ch v? h? tr? ngôn ng? mi?n phí dành cho b?n. G?i s? [phone].         [facility] complies with applicable Federal civil rights laws and does not discriminate on the basis of race, color, national origin, age, disability, or sex.

## 2017-04-25 ENCOUNTER — TELEPHONE (OUTPATIENT)
Dept: FAMILY MEDICINE | Facility: CLINIC | Age: 27
End: 2017-04-25

## 2017-04-25 NOTE — TELEPHONE ENCOUNTER
Spoke with patient and he states that prescriptions for Zanaflex, Medrol Dose, Hydrocortisone are not covered and needs a prior authorization     Spoke with Liyah at [facility]'s [phone] and she states that medication is covered under patient's insurance and the amount to be pain is his co pay     Advised patient of above

## 2017-04-25 NOTE — TELEPHONE ENCOUNTER
----- Message from Amira Del Real sent at 4/25/2017  1:50 PM CDT -----  Contact: self  Pt needs all medication refilled. Please call [phone]. Thanks

## 2017-05-10 NOTE — PROGRESS NOTES
"Routine Office Visit    Patient Name: Greg Castellanos    : 1990  MRN: [id number]    Subjective:  Greg is a 27 y.o. male who presents today for:    1. Rash  Patient presenting today with what he calls a fungal rash on his back.  States that he has had this off and on for years.  It does occasionally get itchy.  There is never any drainage.  He notices the discoloration of patches on this back.  States that several people in his family have the same thing.  He would like something to use to help get rid of the rash.  He states that it normally comes on spontaneously and leaves spontaneously.      2.  Chronic lower back pain  Patient states that he has had lower back pain "for a long time".  The pain stays in his back.  The pain is cramping in nature and is worsened with standing up and relieved with sitting.  Certain movements will also cause the pain to recur.  There is no radiation of pain down the legs.  There is no weakness or incontinence.      Past Medical History  Past Medical History:   Diagnosis Date    Bronchitis     Knee contusion     Peptic ulcer     Shingles     Shingles        Past Surgical History  Past Surgical History:   Procedure Laterality Date    CIRCUMCISION, PRIMARY         Family History  Family History   Problem Relation Age of Onset    Cancer Maternal Grandfather     Diabetes Paternal Grandfather     No Known Problems Mother     No Known Problems Father     Lupus Maternal Aunt     Diabetes Maternal Uncle        Social History  Social History     Social History    Marital status:      Spouse name: N/A    Number of children: N/A    Years of education: N/A     Occupational History    Not on file.     Social History Main Topics    Smoking status: Never Smoker    Smokeless tobacco: Never Used    Alcohol use No    Drug use: No    Sexual activity: Yes     Partners: Female     Other Topics Concern    Not on file     Social History Narrative       Current " Medications  Current Outpatient Prescriptions on File Prior to Visit   Medication Sig Dispense Refill    hydrocortisone 2.5 % ointment Apply topically daily as needed. 20 g 1     No current facility-administered medications on file prior to visit.        Allergies   Review of patient's allergies indicates:   Allergen Reactions    Animal derived oil      Dander      Pollen extracts        Review of Systems (Pertinent positives)  Review of Systems   Constitutional: Negative.    HENT: Negative.    Eyes: Negative.    Respiratory: Negative.    Cardiovascular: Negative.    Gastrointestinal: Negative.    Genitourinary: Negative.    Musculoskeletal: Positive for back pain and myalgias.   Skin: Positive for itching and rash.   Neurological: Negative.          /84 (BP Location: Left arm, Patient Position: Sitting, BP Method: Manual)  Pulse 70  Temp 98.1 °F (36.7 °C) (Oral)   Resp 20  Ht 6' (1.829 m)  Wt 93 kg (205 lb 0.4 oz)  SpO2 98%  BMI 27.81 kg/m2    GENERAL APPEARANCE: in no apparent distress and well developed and well nourished  HEENT: PERRL, EOMI, Sclera clear, anicteric, Oropharynx clear, no lesions, Neck supple with midline trachea  NECK: normal, supple, no adenopathy, thyroid normal in size  RESPIRATORY: appears well, vitals normal, no respiratory distress, acyanotic, normal RR, chest clear, no wheezing, crepitations, rhonchi, normal symmetric air entry  HEART: regular rate and rhythm, S1, S2 normal, no murmur, click, rub or gallop.    ABDOMEN: abdomen is soft without tenderness, no masses, no hernias, no organomegaly, no rebound, no guarding. Suprapubic tenderness absent. No CVA tenderness.  NEUROLOGIC: normal without focal findings, CN II-XII are intact.  reflexes 2 for biceps, brachial, patellar and achilles bilateral and symmetric, sensation grossly normal  SKIN: hyperpigmented patches on back without drainage  PSYCH: Alert, oriented x 3, thought content appropriate, speech normal, pleasant and  cooperative, good eye contact, well groomed.    Assessment/Plan:  Greg Castellanos is a 27 y.o. male who presents today for :    Greg was seen today for back pain and lesions.    Diagnoses and all orders for this visit:    Rash  -     Ambulatory referral to Dermatology    Chronic bilateral low back pain without sciatica  -     methylPREDNISolone (MEDROL DOSEPACK) 4 mg tablet; use as directed  -     tizanidine (ZANAFLEX) 4 MG tablet; Take 1 tablet (4 mg total) by mouth every 6 (six) hours as needed.    Sleep apnea, unspecified type  -     Ambulatory consult for Sleep Study      1.  selsum blue for rash  2.  Patient to take medrol dose pack and zanaflex as [id number]  3.  Patient requested referral for sleep study due to him snoring  4.  Patient to follow up as needed.    Deejay Toribio MD

## 2017-09-08 ENCOUNTER — TELEPHONE (OUTPATIENT)
Dept: FAMILY MEDICINE | Facility: CLINIC | Age: 27
End: 2017-09-08

## 2017-09-08 ENCOUNTER — HOSPITAL ENCOUNTER (EMERGENCY)
Facility: HOSPITAL | Age: 27
Discharge: HOME | End: 2017-09-08
Attending: EMERGENCY MEDICINE
Payer: MEDICAID

## 2017-09-08 VITALS
TEMPERATURE: 98 F | HEIGHT: 72 IN | SYSTOLIC BLOOD PRESSURE: 168 MMHG | BODY MASS INDEX: 30.48 KG/M2 | RESPIRATION RATE: 18 BRPM | OXYGEN SATURATION: 100 % | HEART RATE: 71 BPM | DIASTOLIC BLOOD PRESSURE: 84 MMHG | WEIGHT: 225 LBS

## 2017-09-08 DIAGNOSIS — R22.1 NECK MASS: ICD-10-CM

## 2017-09-08 DIAGNOSIS — S46.911S RIGHT SHOULDER STRAIN, SEQUELA: Primary | ICD-10-CM

## 2017-09-08 PROCEDURE — 99283 EMERGENCY DEPT VISIT LOW MDM: CPT

## 2017-09-08 RX ORDER — CYCLOBENZAPRINE HCL 10 MG
10 TABLET ORAL 3 TIMES DAILY PRN
Qty: 20 TABLET | Refills: 0 | Status: SHIPPED | OUTPATIENT
Start: 2017-09-08 | End: 2017-09-13

## 2017-09-08 RX ORDER — IBUPROFEN 600 MG/1
600 TABLET ORAL EVERY 6 HOURS PRN
Qty: 20 TABLET | Refills: 0 | Status: SHIPPED | OUTPATIENT
Start: 2017-09-08 | End: 2017-09-18

## 2017-09-08 NOTE — TELEPHONE ENCOUNTER
----- Message from Mega Mcnamara sent at 9/8/2017 11:53 AM CDT -----  Contact: Pt  X_ 1st Request  _ 2nd Request  _ 3rd Request    Who: GLADIS PENNINGTON [[id number]]    Why: Patient would like to speak with staff in regards to an appointment today. Patient would like to be seen for neck and shoulder injury    What Number to Call Back: [phone]    When to Expect a call back: (Before the end of the day)  -- if call after 3:00 call back will be tomorrow.

## 2017-09-08 NOTE — DISCHARGE INSTRUCTIONS
Schedule appointment with orthopedic physician for reevaluation of symptoms and further care.  Schedule appointment with dermatologist for reevaluation and further care.  Return to this ED if any problems occur.

## 2017-09-08 NOTE — ED TRIAGE NOTES
Patient c/o right shoulder pain since 9/2/17 after lifting a pallet with bikes on it at work. Patient had been doing cold compress for his pain. Patient also c/o bumps on his neck for 1 month.
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
## 2017-09-18 ENCOUNTER — HOSPITAL ENCOUNTER (EMERGENCY)
Facility: HOSPITAL | Age: 27
Discharge: HOME | End: 2017-09-18
Payer: MEDICAID

## 2017-09-18 VITALS
HEIGHT: 72 IN | HEART RATE: 84 BPM | TEMPERATURE: 98 F | OXYGEN SATURATION: 96 % | BODY MASS INDEX: 30.48 KG/M2 | RESPIRATION RATE: 18 BRPM | WEIGHT: 225 LBS | DIASTOLIC BLOOD PRESSURE: 92 MMHG | SYSTOLIC BLOOD PRESSURE: 143 MMHG

## 2017-09-18 DIAGNOSIS — S46.911A RIGHT SHOULDER STRAIN, INITIAL ENCOUNTER: ICD-10-CM

## 2017-09-18 DIAGNOSIS — T14.90XA TRAUMA: ICD-10-CM

## 2017-09-18 DIAGNOSIS — L02.213 CUTANEOUS ABSCESS OF CHEST WALL: Primary | ICD-10-CM

## 2017-09-18 PROCEDURE — 10061 I&D ABSCESS COMP/MULTIPLE: CPT

## 2017-09-18 PROCEDURE — 25000003 PHARM REV CODE 250

## 2017-09-18 PROCEDURE — 99283 EMERGENCY DEPT VISIT LOW MDM: CPT | Mod: 25

## 2017-09-18 RX ORDER — SULINDAC 150 MG/1
150 TABLET ORAL 2 TIMES DAILY
Qty: 20 TABLET | Refills: 0 | Status: SHIPPED | OUTPATIENT
Start: 2017-09-18 | End: 2018-06-25

## 2017-09-18 RX ORDER — SULFAMETHOXAZOLE AND TRIMETHOPRIM 800; 160 MG/1; MG/1
2 TABLET ORAL
Status: COMPLETED | OUTPATIENT
Start: 2017-09-18 | End: 2017-09-18

## 2017-09-18 RX ORDER — LIDOCAINE HYDROCHLORIDE 10 MG/ML
10 INJECTION INFILTRATION; PERINEURAL
Status: COMPLETED | OUTPATIENT
Start: 2017-09-18 | End: 2017-09-18

## 2017-09-18 RX ORDER — SULFAMETHOXAZOLE AND TRIMETHOPRIM 800; 160 MG/1; MG/1
2 TABLET ORAL 2 TIMES DAILY
Qty: 28 TABLET | Refills: 0 | Status: SHIPPED | OUTPATIENT
Start: 2017-09-18 | End: 2017-09-25

## 2017-09-18 RX ADMIN — LIDOCAINE HYDROCHLORIDE 10 ML: 10 INJECTION, SOLUTION INFILTRATION; PERINEURAL at 06:09

## 2017-09-18 RX ADMIN — SULFAMETHOXAZOLE AND TRIMETHOPRIM 2 TABLET: 800; 160 TABLET ORAL at 06:09

## 2017-09-18 NOTE — ED PROVIDER NOTES
"Encounter Date: 9/18/2017    SCRIBE #1 NOTE: I, Makayla Zafar, am scribing for, and in the presence of,  Shawn Diaz MD . I have scribed the following portions of the note - Other sections scribed: HPI and ROS .       History     Chief Complaint   Patient presents with    Shoulder Injury     " Friday at work I hurt my right shoulder and it is painful to move it since. I was lifting alot of bikes"     Abscess     Right back x4 days      CC: Shoulder Injury and Abscess.   History obtained from patient.  Pt arrived via personal transportation.     HPI: This 27 y.o. Male, who has a history No date: Bronchitis  No date: Knee contusion  No date: Peptic ulcer  No date: Shingles  No date: Shingles presents to the ED for emergent evaluation of          The history is provided by the patient. No  was used.     Review of patient's allergies indicates:   Allergen Reactions    Animal derived oil      Dander      Pollen extracts      Past Medical History:   Diagnosis Date    Bronchitis     Knee contusion     Peptic ulcer     Shingles     Shingles      Past Surgical History:   Procedure Laterality Date    CIRCUMCISION, PRIMARY       Family History   Problem Relation Age of Onset    Cancer Maternal Grandfather     Diabetes Paternal Grandfather     No Known Problems Mother     No Known Problems Father     Lupus Maternal Aunt     Diabetes Maternal Uncle      Social History   Substance Use Topics    Smoking status: Never Smoker    Smokeless tobacco: Never Used    Alcohol use No     Review of Systems    Physical Exam     Initial Vitals [09/18/17 1735]   BP Pulse Resp Temp SpO2   (!) 145/90 74 16 98.1 °F (36.7 °C) 100 %      MAP       108.33         Physical Exam well-developed well-nourished black male alert oriented ×3  HEENT exam pupils reactive nonicteric nares benign moist mixed membranes posterior pharynx benign  Neck no point vertebral tenderness no paraspinal tenderness no step-off  No " significant adenopathy  Lungs clear no rales rhonchi or wheezing next lung cardiac vascular regular rate and rhythm no murmur rub or gallop  Musculoskeletal right shoulder painful with greater than 90° adduction, diffuse tenderness over anterior cuff, distal neurovascular intact no increased warmth  Skin 2 x 4 cm area of raised right lateral chest wall with central fluctuance and early purulence escaping increased warmth    ED Course   I & D - Incision and Drainage  Date/Time: 9/18/2017 7:13 PM  Performed by: DYANA DIAZ  Authorized by: DYANA DIAZ   Consent Done: Not Needed  Type: abscess  Body area: trunk (right lateral chest wall)    Anesthesia:  Local Anesthetic: lidocaine 1% without epinephrine  Patient sedated: no  Scalpel size: 11  Incision type: single straight  Complexity: complex  Drainage: pus  Drainage amount: moderate  Packing material: 1/4 in iodoform gauze  Complications: No  Estimated blood loss (mL): 4  Specimens: No  Implants: No        Labs Reviewed - No data to display       X-Rays:   Independently Interpreted Readings:   Other Readings:  Right shoulder no fracture          MDM 4 day history of swelling of boil of right lateral chest wall treated with hot compresses without relief, he has no prior history of abscesses, his girlfriend currently has an abscess he base with color towels were discussed same.  September 2 lifting a pallet and felt a strain in his right shoulder continues intermittently differential includes fracture sprain strain dislocation              ED Course      Clinical Impression:   The primary encounter diagnosis was Cutaneous abscess of chest wall. Diagnoses of Trauma and Right shoulder strain, initial encounter were also pertinent to this visit.                Physician Attestation for Scribe:  Physician Attestation Statement for Scribe #1: I, Dyana Diaz MD, reviewed documentation, as scribed by Makayla Zafar  in my presence, and it is both accurate and  complete             Shawn Diaz MD  09/18/17 1914       Shawn Diaz MD  09/18/17 1919       Shawn Diaz MD  09/18/17 1920       Shawn Diaz MD  11/21/17 1507
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
## 2017-09-19 NOTE — ED PROVIDER NOTES
"Encounter Date: 9/18/2017    SCRIBE #1 NOTE: I, Makayla Zafar, am scribing for, and in the presence of,  Shawn Diaz MD . I have scribed the following portions of the note - Other sections scribed: HPI and ROS .       History     Chief Complaint   Patient presents with    Shoulder Injury     " Friday at work I hurt my right shoulder and it is painful to move it since. I was lifting alot of bikes"     Abscess     Right back x4 days          Review of patient's allergies indicates:   Allergen Reactions    Animal derived oil      Dander      Pollen extracts      Past Medical History:   Diagnosis Date    Bronchitis     Knee contusion     Peptic ulcer     Shingles     Shingles      Past Surgical History:   Procedure Laterality Date    CIRCUMCISION, PRIMARY       Family History   Problem Relation Age of Onset    Cancer Maternal Grandfather     Diabetes Paternal Grandfather     No Known Problems Mother     No Known Problems Father     Lupus Maternal Aunt     Diabetes Maternal Uncle      Social History   Substance Use Topics    Smoking status: Never Smoker    Smokeless tobacco: Never Used    Alcohol use No     Review of Systems   Constitutional: Negative for chills and fever.   HENT: Negative for rhinorrhea.    Eyes: Negative for redness.   Respiratory: Negative for cough and shortness of breath.    Cardiovascular: Negative for chest pain.   Gastrointestinal: Negative for diarrhea, nausea and vomiting.   Genitourinary: Negative for difficulty urinating and dysuria.   Musculoskeletal: Positive for arthralgias.   Skin:        (+) abscess to R lateral flank    Neurological: Negative for numbness.       Physical Exam     Initial Vitals [09/18/17 1735]   BP Pulse Resp Temp SpO2   (!) 145/90 74 16 98.1 °F (36.7 °C) 100 %      MAP       108.33         Physical Exam    ED Course   Procedures  Labs Reviewed - No data to display                     Scribe Attestation:   Scribe #1: I performed the above scribed " service and the documentation accurately describes the services I performed. I attest to the accuracy of the note.    Attending Attestation:           Physician Attestation for Scribe:  Physician Attestation Statement for Scribe #1: I, Shawn Diaz MD, reviewed documentation, as scribed by Makayla Zafar  in my presence, and it is both accurate and complete.                 ED Course      Clinical Impression:   The primary encounter diagnosis was Cutaneous abscess of chest wall. Diagnoses of Trauma and Right shoulder strain, initial encounter were also pertinent to this visit.

## 2017-09-23 DIAGNOSIS — L73.1 PSEUDOFOLLICULITIS BARBAE: ICD-10-CM

## 2017-09-24 RX ORDER — HYDROCORTISONE 25 MG/G
OINTMENT TOPICAL DAILY PRN
Qty: 20 G | Refills: 1 | Status: SHIPPED | OUTPATIENT
Start: 2017-09-24 | End: 2017-12-14 | Stop reason: SDUPTHER

## 2017-10-03 ENCOUNTER — TELEPHONE (OUTPATIENT)
Dept: FAMILY MEDICINE | Facility: CLINIC | Age: 27
End: 2017-10-03

## 2017-10-03 ENCOUNTER — OFFICE VISIT (OUTPATIENT)
Dept: FAMILY MEDICINE | Facility: CLINIC | Age: 27
End: 2017-10-03
Payer: MEDICAID

## 2017-10-03 VITALS
OXYGEN SATURATION: 98 % | HEIGHT: 72 IN | WEIGHT: 205 LBS | TEMPERATURE: 98 F | RESPIRATION RATE: 17 BRPM | SYSTOLIC BLOOD PRESSURE: 134 MMHG | BODY MASS INDEX: 27.77 KG/M2 | HEART RATE: 70 BPM | DIASTOLIC BLOOD PRESSURE: 80 MMHG

## 2017-10-03 DIAGNOSIS — M25.511 CHRONIC RIGHT SHOULDER PAIN: Primary | ICD-10-CM

## 2017-10-03 DIAGNOSIS — Z31.69 PRE-CONCEPTION COUNSELING: ICD-10-CM

## 2017-10-03 DIAGNOSIS — F52.32 DELAYED EJACULATION: ICD-10-CM

## 2017-10-03 DIAGNOSIS — G89.29 CHRONIC RIGHT SHOULDER PAIN: Primary | ICD-10-CM

## 2017-10-03 PROCEDURE — 99214 OFFICE O/P EST MOD 30 MIN: CPT | Mod: PBBFAC,PN | Performed by: INTERNAL MEDICINE

## 2017-10-03 PROCEDURE — 99214 OFFICE O/P EST MOD 30 MIN: CPT | Mod: S$PBB,,, | Performed by: INTERNAL MEDICINE

## 2017-10-03 PROCEDURE — 99999 PR PBB SHADOW E&M-EST. PATIENT-LVL IV: CPT | Mod: PBBFAC,,, | Performed by: INTERNAL MEDICINE
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
## 2017-10-03 NOTE — TELEPHONE ENCOUNTER
----- Message from Opal Walls sent at 10/3/2017  9:49 AM CDT -----  Contact: Self   Patient need to have his packing removed from his boil he can't wait until the next available. Please call at  [phone]

## 2017-12-13 ENCOUNTER — PATIENT MESSAGE (OUTPATIENT)
Dept: FAMILY MEDICINE | Facility: CLINIC | Age: 27
End: 2017-12-13

## 2017-12-13 DIAGNOSIS — L73.1 PSEUDOFOLLICULITIS BARBAE: Primary | ICD-10-CM

## 2017-12-14 DIAGNOSIS — L73.1 PSEUDOFOLLICULITIS BARBAE: ICD-10-CM

## 2017-12-14 RX ORDER — FLUTICASONE PROPIONATE 50 MCG
1 SPRAY, SUSPENSION (ML) NASAL 2 TIMES DAILY
Qty: 16 G | Refills: 3 | Status: SHIPPED | OUTPATIENT
Start: 2017-12-14 | End: 2019-09-09 | Stop reason: SDUPTHER

## 2017-12-14 RX ORDER — HYDROCORTISONE 25 MG/G
OINTMENT TOPICAL DAILY PRN
Qty: 20 G | Refills: 1 | Status: SHIPPED | OUTPATIENT
Start: 2017-12-14 | End: 2019-01-28 | Stop reason: SDUPTHER

## 2018-05-17 ENCOUNTER — TELEPHONE (OUTPATIENT)
Dept: FAMILY MEDICINE | Facility: CLINIC | Age: 28
End: 2018-05-17

## 2018-05-17 NOTE — TELEPHONE ENCOUNTER
Spoke with patient and he informed me that he started cough blood small in mucus on yesterday. Patient also said that he has been having so sinus problems. Patient denies any fever or shortness of breath. Patient wants to see provider before 5/31/18. Rescheduled with Dr Garcia on 5/21/18.

## 2018-05-17 NOTE — TELEPHONE ENCOUNTER
----- Message from Nayely Austin sent at 5/17/2018  2:35 PM CDT -----  Contact: self   Patient feels he needs a breathing treatment. He says the pollen has him at times where he cant breathe and coughing so hard he coughs up blood. He would like to be seen as soon as possible. Please call at [phone]

## 2018-05-21 ENCOUNTER — OFFICE VISIT (OUTPATIENT)
Dept: FAMILY MEDICINE | Facility: CLINIC | Age: 28
End: 2018-05-21
Payer: MEDICAID

## 2018-05-21 VITALS
OXYGEN SATURATION: 98 % | DIASTOLIC BLOOD PRESSURE: 78 MMHG | HEART RATE: 68 BPM | BODY MASS INDEX: 29.43 KG/M2 | WEIGHT: 217.31 LBS | TEMPERATURE: 97 F | HEIGHT: 72 IN | SYSTOLIC BLOOD PRESSURE: 128 MMHG

## 2018-05-21 DIAGNOSIS — N46.9 MALE FERTILITY PROBLEM: ICD-10-CM

## 2018-05-21 DIAGNOSIS — J01.90 ACUTE BACTERIAL RHINOSINUSITIS: Primary | ICD-10-CM

## 2018-05-21 DIAGNOSIS — Z31.41 FERTILITY TESTING: ICD-10-CM

## 2018-05-21 DIAGNOSIS — B96.89 ACUTE BACTERIAL RHINOSINUSITIS: Primary | ICD-10-CM

## 2018-05-21 PROCEDURE — 99214 OFFICE O/P EST MOD 30 MIN: CPT | Mod: S$PBB,,, | Performed by: INTERNAL MEDICINE

## 2018-05-21 PROCEDURE — 99999 PR PBB SHADOW E&M-EST. PATIENT-LVL III: CPT | Mod: PBBFAC,,, | Performed by: INTERNAL MEDICINE

## 2018-05-21 PROCEDURE — 99213 OFFICE O/P EST LOW 20 MIN: CPT | Mod: PBBFAC,PN | Performed by: INTERNAL MEDICINE

## 2018-05-21 RX ORDER — AMOXICILLIN AND CLAVULANATE POTASSIUM 875; 125 MG/1; MG/1
1 TABLET, FILM COATED ORAL 2 TIMES DAILY
Qty: 20 TABLET | Refills: 0 | Status: SHIPPED | OUTPATIENT
Start: 2018-05-21 | End: 2018-05-31

## 2018-05-21 RX ORDER — METHYLPREDNISOLONE SOD SUCC 125 MG
125 VIAL (EA) INJECTION ONCE
Status: COMPLETED | OUTPATIENT
Start: 2018-05-21 | End: 2018-05-21

## 2018-05-21 RX ORDER — CETIRIZINE HYDROCHLORIDE 10 MG/1
10 TABLET ORAL DAILY
Refills: 0 | COMMUNITY
Start: 2018-05-21 | End: 2022-03-15

## 2018-05-21 RX ORDER — BENZONATATE 200 MG/1
200 CAPSULE ORAL 3 TIMES DAILY PRN
Qty: 10 CAPSULE | Refills: 0 | Status: SHIPPED | OUTPATIENT
Start: 2018-05-21 | End: 2018-05-31

## 2018-05-21 RX ORDER — ALBUTEROL SULFATE 90 UG/1
2 AEROSOL, METERED RESPIRATORY (INHALATION) EVERY 6 HOURS PRN
Qty: 18 G | Refills: 0 | Status: SHIPPED | OUTPATIENT
Start: 2018-05-21 | End: 2019-01-28 | Stop reason: SDUPTHER

## 2018-05-21 RX ADMIN — METHYLPREDNISOLONE SODIUM SUCCINATE 125 MG: 125 INJECTION, POWDER, FOR SOLUTION INTRAMUSCULAR; INTRAVENOUS at 12:05

## 2018-05-21 NOTE — PROGRESS NOTES
HISTORY OF PRESENT ILLNESS:  Greg Castellanos is a 28 y.o. male who presents to the clinic today for Cough (a month)   Coughing up mucus, one episode with streak of blood.  Yellow mucus.  Congestion, postnasal drip present.  Headache when outside for long time.  No ear pain or drainage.  No shortness of breath.  Some sore throat. Tried Flonase at home.      PAST MEDICAL HISTORY:  Past Medical History:   Diagnosis Date    Bronchitis     Knee contusion     Peptic ulcer     Shingles     Shingles        PAST SURGICAL HISTORY:  Past Surgical History:   Procedure Laterality Date    CIRCUMCISION, PRIMARY         SOCIAL HISTORY:  Social History     Social History    Marital status:      Spouse name: N/A    Number of children: N/A    Years of education: N/A     Occupational History    Not on file.     Social History Main Topics    Smoking status: Never Smoker    Smokeless tobacco: Never Used    Alcohol use No    Drug use: No    Sexual activity: Yes     Partners: Female     Other Topics Concern    Not on file     Social History Narrative    No narrative on file       FAMILY HISTORY:  Family History   Problem Relation Age of Onset    Cancer Maternal Grandfather     Diabetes Paternal Grandfather     No Known Problems Mother     No Known Problems Father     Lupus Maternal Aunt     Diabetes Maternal Uncle        ALLERGIES AND MEDICATIONS: updated and reviewed.  Review of patient's allergies indicates:   Allergen Reactions    Animal derived oil      Dander      Pollen extracts      Medication List with Changes/Refills   Current Medications    FLUTICASONE (FLONASE) 50 MCG/ACTUATION NASAL SPRAY    1 spray by Each Nare route 2 (two) times daily.    HYDROCORTISONE 2.5 % OINTMENT    Apply topically daily as needed.    SULINDAC (CLINORIL) 150 MG TABLET    Take 1 tablet (150 mg total) by mouth 2 (two) times daily.          CARE TEAM:  Patient Care Team:  Lai St MD as PCP - General (Internal  Medicine)         REVIEW OF SYSTEMS:  Review of Systems   Constitutional: Positive for chills and fever.   HENT: Positive for postnasal drip, rhinorrhea, sinus pressure and sore throat. Negative for ear discharge, ear pain, facial swelling, hearing loss, nosebleeds, sinus pain and sneezing.    Eyes: Negative for photophobia, pain, redness and visual disturbance.   Respiratory: Positive for cough and wheezing. Negative for choking, shortness of breath and stridor.    Cardiovascular: Negative for chest pain, palpitations and leg swelling.   Gastrointestinal: Negative for abdominal pain, constipation, diarrhea, nausea and vomiting.   Genitourinary: Negative for dysuria, flank pain and hematuria.   Musculoskeletal: Negative for gait problem, myalgias, neck pain and neck stiffness.   Skin: Negative for rash and wound.   Neurological: Negative for syncope, light-headedness, numbness and headaches.   Psychiatric/Behavioral: Negative for confusion, decreased concentration, dysphoric mood and hallucinations.       PHYSICAL EXAM:  Vitals:    05/21/18 1147   BP: 128/78   Pulse: 68   Temp: 97.4 °F (36.3 °C)     Weight: 98.6 kg (217 lb 4.8 oz)   Height: 6' (182.9 cm)   Body mass index is 29.47 kg/m².    Physical Examination: General appearance - alert, well appearing, and in no distress, oriented to person, place, and time and normal appearing weight  Mental status - alert, oriented to person, place, and time, normal mood, behavior, speech, dress, motor activity, and thought processes  Eyes - pupils equal and reactive, extraocular eye movements intact, sclera anicteric, left eye normal, right eye normal  Ears - bilateral TM's and external ear canals normal  Mouth - mucous membranes moist, pharynx normal without lesions and erythematous  Chest - clear to auscultation, no wheezes, rales or rhonchi, symmetric air entry, no tachypnea, retractions or cyanosis  Heart - normal rate, regular rhythm, normal S1, S2, no murmurs, rubs,  clicks or gallops  Abdomen - soft, nontender, nondistended, no masses or organomegaly  Extremities - no pedal edema noted       ASSESSMENT AND PLAN:  1. Acute bacterial rhinosinusitis  - amoxicillin-clavulanate 875-125mg (AUGMENTIN) 875-125 mg per tablet; Take 1 tablet by mouth 2 (two) times daily.  Dispense: 20 tablet; Refill: 0  - albuterol 90 mcg/actuation inhaler; Inhale 2 puffs into the lungs every 6 (six) hours as needed for Wheezing. Rescue  Dispense: 18 g; Refill: 0  - benzonatate (TESSALON) 200 MG capsule; Take 1 capsule (200 mg total) by mouth 3 (three) times daily as needed for Cough.  Dispense: 10 capsule; Refill: 0  - methylPREDNISolone sodium succinate injection 125 mg; Inject 125 mg into the muscle once.  - cetirizine (ZYRTEC) 10 MG tablet; Take 1 tablet (10 mg total) by mouth once daily.; Refill: 0  - PRN Tylenol and ibuprofen for fever and sore throat; salt water gargles PRN for sore throat.    2. Fertility testing, 3. 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

## 2018-06-25 ENCOUNTER — HOSPITAL ENCOUNTER (EMERGENCY)
Facility: HOSPITAL | Age: 28
Discharge: HOME | End: 2018-06-25
Attending: EMERGENCY MEDICINE
Payer: MEDICAID

## 2018-06-25 VITALS
HEART RATE: 67 BPM | TEMPERATURE: 98 F | WEIGHT: 218 LBS | RESPIRATION RATE: 18 BRPM | DIASTOLIC BLOOD PRESSURE: 107 MMHG | HEIGHT: 72 IN | OXYGEN SATURATION: 99 % | SYSTOLIC BLOOD PRESSURE: 159 MMHG | BODY MASS INDEX: 29.53 KG/M2

## 2018-06-25 DIAGNOSIS — M54.50 ACUTE LEFT-SIDED LOW BACK PAIN WITHOUT SCIATICA: Primary | ICD-10-CM

## 2018-06-25 LAB
BILIRUB UR QL STRIP: NEGATIVE
CLARITY UR: CLEAR
COLOR UR: YELLOW
GLUCOSE UR QL STRIP: NEGATIVE
HGB UR QL STRIP: NEGATIVE
KETONES UR QL STRIP: NEGATIVE
LEUKOCYTE ESTERASE UR QL STRIP: NEGATIVE
NITRITE UR QL STRIP: NEGATIVE
PH UR STRIP: 5 [PH] (ref 5–8)
PROT UR QL STRIP: NEGATIVE
SP GR UR STRIP: 1.02 (ref 1–1.03)
URN SPEC COLLECT METH UR: NORMAL
UROBILINOGEN UR STRIP-ACNC: NEGATIVE EU/DL

## 2018-06-25 PROCEDURE — 96372 THER/PROPH/DIAG INJ SC/IM: CPT

## 2018-06-25 PROCEDURE — 81003 URINALYSIS AUTO W/O SCOPE: CPT

## 2018-06-25 PROCEDURE — 99283 EMERGENCY DEPT VISIT LOW MDM: CPT | Mod: 25

## 2018-06-25 PROCEDURE — 63600175 PHARM REV CODE 636 W HCPCS: Performed by: NURSE PRACTITIONER

## 2018-06-25 RX ORDER — KETOROLAC TROMETHAMINE 30 MG/ML
30 INJECTION, SOLUTION INTRAMUSCULAR; INTRAVENOUS
Status: COMPLETED | OUTPATIENT
Start: 2018-06-25 | End: 2018-06-25

## 2018-06-25 RX ORDER — NAPROXEN 500 MG/1
500 TABLET ORAL 2 TIMES DAILY PRN
Qty: 20 TABLET | Refills: 0 | Status: SHIPPED | OUTPATIENT
Start: 2018-06-25 | End: 2018-11-26

## 2018-06-25 RX ORDER — ORPHENADRINE CITRATE 30 MG/ML
60 INJECTION INTRAMUSCULAR; INTRAVENOUS
Status: COMPLETED | OUTPATIENT
Start: 2018-06-25 | End: 2018-06-25

## 2018-06-25 RX ORDER — TIZANIDINE 4 MG/1
4 TABLET ORAL EVERY 8 HOURS PRN
Qty: 20 TABLET | Refills: 0 | Status: SHIPPED | OUTPATIENT
Start: 2018-06-25 | End: 2018-11-26

## 2018-06-25 RX ADMIN — KETOROLAC TROMETHAMINE 30 MG: 30 INJECTION, SOLUTION INTRAMUSCULAR at 05:06

## 2018-06-25 RX ADMIN — ORPHENADRINE CITRATE 60 MG: 30 INJECTION INTRAMUSCULAR; INTRAVENOUS at 05:06
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
## 2018-06-25 NOTE — DISCHARGE INSTRUCTIONS
Take pain medications as needed only as prescribed.  Do not drive or drink alcohol while taking Zanaflex.    Follow-up with your primary physician for re-evaluation and further management.    Return to the emergency department for any new or worsening symptoms or as needed.

## 2018-06-25 NOTE — ED NOTES
Pt. Stated that he drove her however his wife will be coming and pick him up. Explained to pt. That ketorolac will be given to him now and when his wife arrives he will received orphenadrine. Pt. Stated okay

## 2018-08-20 PROBLEM — J01.90 ACUTE BACTERIAL RHINOSINUSITIS: Status: RESOLVED | Noted: 2018-05-21 | Resolved: 2018-08-20

## 2018-08-20 PROBLEM — B96.89 ACUTE BACTERIAL RHINOSINUSITIS: Status: RESOLVED | Noted: 2018-05-21 | Resolved: 2018-08-20

## 2018-11-26 ENCOUNTER — OFFICE VISIT (OUTPATIENT)
Dept: FAMILY MEDICINE | Facility: CLINIC | Age: 28
End: 2018-11-26
Payer: MEDICAID

## 2018-11-26 VITALS
HEIGHT: 72 IN | HEART RATE: 73 BPM | SYSTOLIC BLOOD PRESSURE: 120 MMHG | RESPIRATION RATE: 17 BRPM | OXYGEN SATURATION: 97 % | TEMPERATURE: 98 F | DIASTOLIC BLOOD PRESSURE: 80 MMHG | WEIGHT: 228.19 LBS | BODY MASS INDEX: 30.91 KG/M2

## 2018-11-26 DIAGNOSIS — B35.6 TINEA CRURIS: ICD-10-CM

## 2018-11-26 DIAGNOSIS — N46.9 MALE FERTILITY PROBLEM: Primary | ICD-10-CM

## 2018-11-26 PROCEDURE — 99999 PR PBB SHADOW E&M-EST. PATIENT-LVL III: CPT | Mod: PBBFAC,,, | Performed by: INTERNAL MEDICINE

## 2018-11-26 PROCEDURE — 99213 OFFICE O/P EST LOW 20 MIN: CPT | Mod: PBBFAC,PN | Performed by: INTERNAL MEDICINE

## 2018-11-26 PROCEDURE — 99214 OFFICE O/P EST MOD 30 MIN: CPT | Mod: S$PBB,,, | Performed by: INTERNAL MEDICINE

## 2018-11-26 RX ORDER — KETOCONAZOLE 20 MG/G
CREAM TOPICAL DAILY
Qty: 30 G | Refills: 1 | Status: SHIPPED | OUTPATIENT
Start: 2018-11-26 | End: 2019-01-28 | Stop reason: SDUPTHER

## 2018-11-26 NOTE — PROGRESS NOTES
"Subjective:       Patient ID: Greg Castellanos is a 28 y.o. male.    Chief Complaint: Recurrent Skin Infections (3 days) and Establish Care (sperm count)    Discuss "jock itch"     HPI: 27 y/o no significant PMHx presents with two months of rash to inguinal area. Stopped wear constrictive underwear. Some improvement but still itching worse after showers no skin break down not using topicals regularlly. He and his fiance have been attempting conception > 2 years withotu success. +a.m. Erections no painful ejaculation       Review of Systems   Constitutional: Negative for activity change, appetite change, fatigue, fever and unexpected weight change.   HENT: Negative for ear pain, rhinorrhea and sore throat.    Eyes: Negative for discharge and visual disturbance.   Respiratory: Negative for chest tightness, shortness of breath and wheezing.    Cardiovascular: Negative for chest pain, palpitations and leg swelling.   Gastrointestinal: Negative for abdominal pain, constipation and diarrhea.   Endocrine: Negative for cold intolerance and heat intolerance.   Genitourinary: Negative for dysuria and hematuria.   Musculoskeletal: Negative for joint swelling and neck stiffness.   Skin: Negative for rash.   Neurological: Negative for dizziness, syncope, weakness and headaches.   Psychiatric/Behavioral: Negative for suicidal ideas.       Objective:     Vitals:    11/26/18 1359   BP: 120/80   BP Location: Right arm   Patient Position: Sitting   Pulse: 73   Resp: 17   Temp: 98 °F (36.7 °C)   TempSrc: Oral   SpO2: 97%   Weight: 103.5 kg (228 lb 2.8 oz)   Height: 6' (1.829 m)          Physical Exam   Constitutional: He is oriented to person, place, and time. He appears well-developed and well-nourished.   HENT:   Head: Normocephalic and atraumatic.   Eyes: Conjunctivae are normal. Pupils are equal, round, and reactive to light.   Neck: Normal range of motion.   Cardiovascular: Normal rate and regular rhythm. Exam reveals no gallop " and no friction rub.   No murmur heard.  Pulmonary/Chest: Effort normal and breath sounds normal. He has no wheezes. He has no rales.   Abdominal: Soft. Bowel sounds are normal. There is no tenderness. There is no rebound and no guarding.   Musculoskeletal: Normal range of motion. He exhibits no edema or tenderness.   Neurological: He is alert and oriented to person, place, and time. No cranial nerve deficit.   Skin: Skin is warm and dry.   Psychiatric: He has a normal mood and affect.       Assessment and Plan   1. Male fertility problem  Referral to urology for semen analysis  - Ambulatory referral to Urology    2. Tinea cruris  Topical azole daily keep skin dry  - ketoconazole (NIZORAL) 2 % cream; Apply topically once daily.  Dispense: 30 g; Refill: 1

## 2019-01-04 ENCOUNTER — TELEPHONE (OUTPATIENT)
Dept: FAMILY MEDICINE | Facility: CLINIC | Age: 29
End: 2019-01-04

## 2019-01-04 NOTE — TELEPHONE ENCOUNTER
Patient called to let you know he had Hypospadias. He states to just let you know and you would know what he was talking about .

## 2019-01-26 ENCOUNTER — PATIENT MESSAGE (OUTPATIENT)
Dept: FAMILY MEDICINE | Facility: CLINIC | Age: 29
End: 2019-01-26

## 2019-01-28 DIAGNOSIS — J01.90 ACUTE BACTERIAL RHINOSINUSITIS: ICD-10-CM

## 2019-01-28 DIAGNOSIS — L73.1 PSEUDOFOLLICULITIS BARBAE: ICD-10-CM

## 2019-01-28 DIAGNOSIS — B96.89 ACUTE BACTERIAL RHINOSINUSITIS: ICD-10-CM

## 2019-01-28 DIAGNOSIS — B35.6 TINEA CRURIS: ICD-10-CM

## 2019-01-28 RX ORDER — KETOCONAZOLE 20 MG/G
CREAM TOPICAL DAILY
Qty: 30 G | Refills: 1 | OUTPATIENT
Start: 2019-01-28

## 2019-01-28 RX ORDER — HYDROCORTISONE 25 MG/G
OINTMENT TOPICAL DAILY PRN
Qty: 20 G | Refills: 1 | Status: SHIPPED | OUTPATIENT
Start: 2019-01-28 | End: 2019-09-09 | Stop reason: SDUPTHER

## 2019-01-28 RX ORDER — ALBUTEROL SULFATE 90 UG/1
2 AEROSOL, METERED RESPIRATORY (INHALATION) EVERY 6 HOURS PRN
Qty: 18 G | Refills: 0 | Status: SHIPPED | OUTPATIENT
Start: 2019-01-28 | End: 2019-09-09 | Stop reason: SDUPTHER

## 2019-01-28 RX ORDER — KETOCONAZOLE 20 MG/G
CREAM TOPICAL DAILY
Qty: 30 G | Refills: 1 | Status: SHIPPED | OUTPATIENT
Start: 2019-01-28 | End: 2019-09-09 | Stop reason: SDUPTHER

## 2019-01-28 RX ORDER — HYDROCORTISONE 25 MG/G
OINTMENT TOPICAL DAILY PRN
Qty: 20 G | Refills: 1 | OUTPATIENT
Start: 2019-01-28 | End: 2019-02-07

## 2019-02-05 ENCOUNTER — OFFICE VISIT (OUTPATIENT)
Dept: UROLOGY | Facility: CLINIC | Age: 29
End: 2019-02-05
Payer: MEDICAID

## 2019-02-05 VITALS
WEIGHT: 231.25 LBS | DIASTOLIC BLOOD PRESSURE: 80 MMHG | BODY MASS INDEX: 31.32 KG/M2 | SYSTOLIC BLOOD PRESSURE: 132 MMHG | HEART RATE: 110 BPM | HEIGHT: 72 IN

## 2019-02-05 DIAGNOSIS — N53.19 EJACULATORY DISORDER: ICD-10-CM

## 2019-02-05 DIAGNOSIS — N46.8 PRIMARY MALE INFERTILITY: Primary | ICD-10-CM

## 2019-02-05 LAB
BILIRUB SERPL-MCNC: NORMAL MG/DL
BLOOD URINE, POC: NORMAL
COLOR, POC UA: YELLOW
GLUCOSE UR QL STRIP: NORMAL
KETONES UR QL STRIP: NORMAL
LEUKOCYTE ESTERASE URINE, POC: NORMAL
NITRITE, POC UA: NORMAL
PH, POC UA: 6
PROTEIN, POC: NORMAL
SPECIFIC GRAVITY, POC UA: 1000
UROBILINOGEN, POC UA: NORMAL

## 2019-02-05 PROCEDURE — 99999 PR PBB SHADOW E&M-EST. PATIENT-LVL III: CPT | Mod: PBBFAC,,, | Performed by: UROLOGY

## 2019-02-05 PROCEDURE — 99213 OFFICE O/P EST LOW 20 MIN: CPT | Mod: PBBFAC | Performed by: UROLOGY

## 2019-02-05 PROCEDURE — 99204 PR OFFICE/OUTPT VISIT, NEW, LEVL IV, 45-59 MIN: ICD-10-PCS | Mod: S$PBB,,, | Performed by: UROLOGY

## 2019-02-05 PROCEDURE — 99204 OFFICE O/P NEW MOD 45 MIN: CPT | Mod: S$PBB,,, | Performed by: UROLOGY

## 2019-02-05 PROCEDURE — 81001 URINALYSIS AUTO W/SCOPE: CPT | Mod: PBBFAC | Performed by: UROLOGY

## 2019-02-05 PROCEDURE — 99999 PR PBB SHADOW E&M-EST. PATIENT-LVL III: ICD-10-PCS | Mod: PBBFAC,,, | Performed by: UROLOGY

## 2019-02-05 NOTE — PROGRESS NOTES
Subjective:       Patient ID: Greg Castellanos is a 28 y.o. male who was referred by Lai St MD    Chief Complaint:   Chief Complaint   Patient presents with    Other     new pt coming in fro fertility        Primary Male Infertility  This is a 28 year old male with a 26 year old wife.  She has 2 children from a different partner.  He has never produced a pregnancy.  He works as a .  He denies any family history of fertility problems. He denies any childhood mumps or other testicular infections.  He denies trauma or sporting accidents.  He voids with a normal stream.  He has normal ejaculation with masturbation.  He has to been able to ejaculate during intercourse.  He feels this is stress related and is very frustrating.  He feels that his erections are straight and and no problem until he tries to orgasm with intercourse.    ACTIVE MEDICAL ISSUES:  Patient Active Problem List   Diagnosis    Shoulder strain    Wrist pain, acute    Epigastric pain    Fertility testing    Male fertility problem       PAST MEDICAL HISTORY  Past Medical History:   Diagnosis Date    Bronchitis     Knee contusion     Peptic ulcer     Shingles     Shingles        PAST SURGICAL HISTORY:  Past Surgical History:   Procedure Laterality Date    CIRCUMCISION, PRIMARY         SOCIAL HISTORY:  Social History     Tobacco Use    Smoking status: Never Smoker    Smokeless tobacco: Never Used   Substance Use Topics    Alcohol use: No    Drug use: No       FAMILY HISTORY:  Family History   Problem Relation Age of Onset    Cancer Maternal Grandfather     Diabetes Paternal Grandfather     No Known Problems Mother     No Known Problems Father     Lupus Maternal Aunt     Diabetes Maternal Uncle        ALLERGIES AND MEDICATIONS: updated and reviewed.  Review of patient's allergies indicates:   Allergen Reactions    Animal derived oil      Dander      Pollen extracts      Current Outpatient Medications    Medication Sig    albuterol (PROVENTIL/VENTOLIN HFA) 90 mcg/actuation inhaler Inhale 2 puffs into the lungs every 6 (six) hours as needed for Wheezing. Rescue    cetirizine (ZYRTEC) 10 MG tablet Take 1 tablet (10 mg total) by mouth once daily.    fluticasone (FLONASE) 50 mcg/actuation nasal spray 1 spray by Each Nare route 2 (two) times daily.    hydrocortisone 2.5 % ointment Apply topically daily as needed.    ketoconazole (NIZORAL) 2 % cream Apply topically once daily.     No current facility-administered medications for this visit.        Review of Systems   Constitutional: Negative for activity change, fatigue, fever and unexpected weight change.   HENT: Negative for congestion.    Eyes: Negative for redness.   Respiratory: Negative for chest tightness and shortness of breath.    Cardiovascular: Negative for chest pain and leg swelling.   Gastrointestinal: Negative for abdominal pain, constipation, diarrhea, nausea and vomiting.   Genitourinary: Negative for dysuria, flank pain, frequency, hematuria, penile pain, penile swelling, scrotal swelling, testicular pain and urgency.   Musculoskeletal: Negative for arthralgias and back pain.   Neurological: Negative for dizziness and light-headedness.   Psychiatric/Behavioral: Negative for behavioral problems and confusion. The patient is not nervous/anxious.    All other systems reviewed and are negative.      Objective:      Vitals:    02/05/19 1419   BP: 132/80   Pulse: 110   Weight: 104.9 kg (231 lb 4.2 oz)   Height: 6' (1.829 m)     Physical Exam   Nursing note and vitals reviewed.  Constitutional: He is oriented to person, place, and time. He appears well-developed and well-nourished.   HENT:   Head: Normocephalic.   Eyes: Conjunctivae are normal.   Neck: Normal range of motion. Neck supple. No tracheal deviation present. No thyromegaly present.   Cardiovascular: Normal rate and normal heart sounds.    Pulmonary/Chest: Effort normal and breath sounds  normal. No respiratory distress. He has no wheezes.   Abdominal: Soft. Bowel sounds are normal. There is no hepatosplenomegaly. There is no tenderness. There is no rebound and no CVA tenderness. No hernia.   Genitourinary: Testes normal and penis normal. Right testis shows no mass and no tenderness. Left testis shows no mass and no tenderness. Circumcised.   Musculoskeletal: Normal range of motion. He exhibits no edema or tenderness.   Lymphadenopathy:     He has no cervical adenopathy.   Neurological: He is alert and oriented to person, place, and time.   Skin: Skin is warm and dry. No rash noted. No erythema.     Psychiatric: He has a normal mood and affect. His behavior is normal. Judgment and thought content normal.       Urine dipstick shows negative for all components.  Micro exam: negative for WBC's or RBC's.    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 Ejaculatory disorder          Plan:       1. Primary male infertility  I will look into any organic causes of infertility.    Semen analysis  - POCT urinalysis, dipstick or tablet reag  - US Scrotum And Testicles; Future  - Testosterone; Future  - ESTRADIOL; Future  - Prolactin; Future  - Follicle stimulating hormone; Future  - Luteinizing hormone; Future    2. Ejaculatory disorder  This is likely the main cause of infertility.  Given the stress and anxiety associated with it, I feel that psychiatry would be the best next step.  - Ambulatory consult to Psychiatry            Follow-up in about 6 weeks (around 3/19/2019) for Follow up, Review X-ray, Review labs.

## 2019-03-15 ENCOUNTER — TELEPHONE (OUTPATIENT)
Dept: UROLOGY | Facility: CLINIC | Age: 29
End: 2019-03-15

## 2019-05-20 ENCOUNTER — HOSPITAL ENCOUNTER (EMERGENCY)
Facility: HOSPITAL | Age: 29
Discharge: HOME | End: 2019-05-20
Attending: EMERGENCY MEDICINE
Payer: MEDICAID

## 2019-05-20 VITALS
TEMPERATURE: 98 F | HEART RATE: 79 BPM | BODY MASS INDEX: 31.14 KG/M2 | HEIGHT: 73 IN | WEIGHT: 235 LBS | OXYGEN SATURATION: 100 % | DIASTOLIC BLOOD PRESSURE: 93 MMHG | SYSTOLIC BLOOD PRESSURE: 173 MMHG | RESPIRATION RATE: 18 BRPM

## 2019-05-20 DIAGNOSIS — H00.014 HORDEOLUM EXTERNUM LEFT UPPER EYELID: Primary | ICD-10-CM

## 2019-05-20 PROCEDURE — 25000003 PHARM REV CODE 250: Mod: ER | Performed by: NURSE PRACTITIONER

## 2019-05-20 PROCEDURE — 99284 EMERGENCY DEPT VISIT MOD MDM: CPT | Mod: ER

## 2019-05-20 RX ORDER — NAPROXEN 250 MG/1
500 TABLET ORAL
Status: COMPLETED | OUTPATIENT
Start: 2019-05-20 | End: 2019-05-20

## 2019-05-20 RX ORDER — NAPROXEN 500 MG/1
500 TABLET ORAL 2 TIMES DAILY PRN
Qty: 10 TABLET | Refills: 0 | Status: SHIPPED | OUTPATIENT
Start: 2019-05-20 | End: 2019-05-25

## 2019-05-20 RX ORDER — ERYTHROMYCIN 5 MG/G
OINTMENT OPHTHALMIC EVERY 6 HOURS
Qty: 1 TUBE | Refills: 0 | Status: SHIPPED | OUTPATIENT
Start: 2019-05-20 | End: 2019-05-25

## 2019-05-20 RX ADMIN — NAPROXEN 500 MG: 250 TABLET ORAL at 07:05

## 2019-05-21 NOTE — DISCHARGE INSTRUCTIONS
Please return to the Emergency Department for any new or worsening symptoms including: vision changes, worsening redness or pain, fever, chest pain, shortness of breath, loss of consciousness, dizziness, weakness, or any other concerns.     Please follow up with an Eye Doctor in 1 - 2 days. If you do not have one, you may contact the one listed on your discharge paperwork or you may also call the [facility] Appointment Desk at [phone] to schedule an appointment with one.     Please take all medication as prescribed. You have been prescribed Naproxen for pain. This is an Non-Steroidal Anti-Inflammatory (NSAID) Medication. Please do not take any additional NSAIDs while you are taking this medication including (Advil, Aleve, Motrin, Ibuprofen, Mobic\meloxicam, Naprosyn, Toradol, ketoralac, etc.). Please stop taking this medication if you experience: weakness, itching, yellow skin or eyes, joint pains, vomiting blood, blood or black stools, unusual weight gain, or swelling in your arms, legs, hands, or feet.

## 2019-05-21 NOTE — ED PROVIDER NOTES
Encounter Date: 5/20/2019       History     Chief Complaint   Patient presents with    EYELID PROBLEM     PT REPORTS POSSIBLE STYE TO LEFT EYELID FOR 1 DAY     CC: Left Eyelid Swelling/Pain    HPI: Greg Castellanos, a 29 y.o. male presents to the ED with left eyelid pain and swelling has been ongoing for 1 day.  He reports no injuries or known trauma. He reports pain over the eyelid itself.  No pain in the eye.  He reports no vision changes, photophobia, or double vision.  He reports no fevers or drainage from the eye.  No medications taken prior to arrival.  He was attempting treatment with warm compresses over the eye.  Does not wear contacts.  He is supposed to wear glasses but does not wear them.  Does not have an eye doctor that he currently sees.      The history is provided by the patient. No  was used.     Review of patient's allergies indicates:   Allergen Reactions    Animal derived oil      Dander      Pollen extracts      Past Medical History:   Diagnosis Date    Bronchitis     Knee contusion     Peptic ulcer     Shingles     Shingles      Past Surgical History:   Procedure Laterality Date    CIRCUMCISION, PRIMARY       Family History   Problem Relation Age of Onset    Cancer Maternal Grandfather     Diabetes Paternal Grandfather     No Known Problems Mother     No Known Problems Father     Lupus Maternal Aunt     Diabetes Maternal Uncle      Social History     Tobacco Use    Smoking status: Never Smoker    Smokeless tobacco: Never Used   Substance Use Topics    Alcohol use: No    Drug use: No     Review of Systems   Constitutional: Negative for fever.   HENT: Negative for congestion, drooling, ear discharge, ear pain, sinus pain, sore throat and trouble swallowing.    Eyes: Negative for photophobia, redness, itching and visual disturbance.        (+) Pain to Left upper eyelid.   (+) Raised bump under left upper eyelid.   Skin: Positive for color change (Left upper  lid). Negative for pallor and rash.   Psychiatric/Behavioral: Negative for confusion.       Physical Exam     Initial Vitals [05/20/19 1912]   BP Pulse Resp Temp SpO2   (!) 173/93 79 18 98 °F (36.7 °C) 100 %      MAP       --         Physical Exam    Nursing note and vitals reviewed.  Constitutional: He appears well-developed and well-nourished. He is not diaphoretic. He is cooperative.  Non-toxic appearance. He does not have a sickly appearance. He does not appear ill. No distress.   HENT:   Head: Normocephalic and atraumatic.   Right Ear: Tympanic membrane and external ear normal.   Left Ear: Tympanic membrane and external ear normal.   Nose: No mucosal edema or rhinorrhea.   Mouth/Throat: Uvula is midline and oropharynx is clear and moist. No trismus in the jaw.   Eyes: Conjunctivae and EOM are normal. Pupils are equal, round, and reactive to light. Right eye exhibits no hordeolum. Left eye exhibits hordeolum (mid left upper lid; no increased warmth). Right conjunctiva is not injected. Right conjunctiva has no hemorrhage. Left conjunctiva is not injected. Left conjunctiva has no hemorrhage. No scleral icterus.   Neck: Normal range of motion.   Pulmonary/Chest: No respiratory distress.   Neurological: He is alert and oriented to person, place, and time. He has normal strength. Coordination and gait normal. GCS score is 15. GCS eye subscore is 4. GCS verbal subscore is 5. GCS motor subscore is 6.   Skin: Skin is warm and dry. No bruising and no rash noted. There is erythema (hordeolum left upper lid).   Psychiatric: He has a normal mood and affect. His behavior is normal. Judgment and thought content normal.         ED Course   Procedures  Labs Reviewed - No data to display       Imaging Results    None                APC / Resident Notes:   This is an evaluation of a 29 y.o. male that presents to the Emergency Department for left eyelid swelling/redness. Physical Exam shows a non-toxic, afebrile, and well  appearing male. PERRL. EOM's intact and without pain.  Gross visual acuity intact. There is no proptosis, scleral icterus , erythema or increased warmth in periorbital area. There is no conjunctival injection. No findings of open globe injury. There is no facial rash and a negative Ochoa's sign. There is mild induration over the central portion of the left upper eyelid with trace overlying erythema.  No facial erythema or redness. No drainage.  Vital Signs Are Reassuring. If available, previous records reviewed.     My overall impression is Hordeolum of the left upper eyelid. I considered, but at this time, do not suspect conjunctivitis, iritis, acute angle closure glaucoma, orbital or preseptal cellulitis, herpetic involvement, open globe injury.     D/C Meds: Emycin. D/C Information: Warm compresses. The diagnosis, treatment plan, instructions for follow-up and reevaluation with opthalmology as well as ED return precautions were discussed and understanding was verbalized. All questions or concerns have been addressed. ROGERS Joy, DOMP-C                  Clinical Impression:       ICD-10-CM ICD-9-CM   1. Hordeolum externum left upper eyelid H00.014 373.11         Disposition:   Disposition: Discharged  Condition: Stable                        DIONTE Barrett  05/20/19 2009

## 2019-06-10 ENCOUNTER — NURSE TRIAGE (OUTPATIENT)
Dept: ADMINISTRATIVE | Facility: CLINIC | Age: 29
End: 2019-06-10

## 2019-06-10 ENCOUNTER — PATIENT MESSAGE (OUTPATIENT)
Dept: FAMILY MEDICINE | Facility: CLINIC | Age: 29
End: 2019-06-10

## 2019-06-10 DIAGNOSIS — M54.50 ACUTE MIDLINE LOW BACK PAIN WITHOUT SCIATICA: Primary | ICD-10-CM

## 2019-06-10 RX ORDER — CYCLOBENZAPRINE HCL 10 MG
10 TABLET ORAL NIGHTLY PRN
Qty: 30 TABLET | Refills: 1 | Status: SHIPPED | OUTPATIENT
Start: 2019-06-10 | End: 2019-06-20

## 2019-06-10 RX ORDER — IBUPROFEN 800 MG/1
800 TABLET ORAL 3 TIMES DAILY
Qty: 60 TABLET | Refills: 1 | Status: SHIPPED | OUTPATIENT
Start: 2019-06-10 | End: 2019-09-09 | Stop reason: SDUPTHER

## 2019-06-10 NOTE — TELEPHONE ENCOUNTER
Reason for Disposition   [1] Blurred vision AND [2] new or worsening    Protocols used: EYE PAIN-A-AH    Pt states he has a stye to L eyelid for approx 1 week. Pt states he was seen in ED for complaint and prescribed eye medication without relief. Pt advised per protocol and pt verbalizes understanding.

## 2019-07-05 ENCOUNTER — PATIENT OUTREACH (OUTPATIENT)
Dept: ADMINISTRATIVE | Facility: OTHER | Age: 29
End: 2019-07-05

## 2019-09-05 ENCOUNTER — PATIENT MESSAGE (OUTPATIENT)
Dept: UROLOGY | Facility: CLINIC | Age: 29
End: 2019-09-05

## 2019-09-09 DIAGNOSIS — B96.89 ACUTE BACTERIAL RHINOSINUSITIS: ICD-10-CM

## 2019-09-09 DIAGNOSIS — L73.1 PSEUDOFOLLICULITIS BARBAE: ICD-10-CM

## 2019-09-09 DIAGNOSIS — M54.50 ACUTE MIDLINE LOW BACK PAIN WITHOUT SCIATICA: ICD-10-CM

## 2019-09-09 DIAGNOSIS — B35.6 TINEA CRURIS: ICD-10-CM

## 2019-09-09 DIAGNOSIS — J01.90 ACUTE BACTERIAL RHINOSINUSITIS: ICD-10-CM

## 2019-09-09 RX ORDER — FLUTICASONE PROPIONATE 50 MCG
1 SPRAY, SUSPENSION (ML) NASAL 2 TIMES DAILY
Qty: 16 G | Refills: 3 | Status: SHIPPED | OUTPATIENT
Start: 2019-09-09 | End: 2022-03-15

## 2019-09-09 RX ORDER — ALBUTEROL SULFATE 90 UG/1
2 AEROSOL, METERED RESPIRATORY (INHALATION) EVERY 6 HOURS PRN
Qty: 18 G | Refills: 0 | Status: SHIPPED | OUTPATIENT
Start: 2019-09-09 | End: 2022-03-15

## 2019-09-09 RX ORDER — HYDROCORTISONE 25 MG/G
OINTMENT TOPICAL DAILY PRN
Qty: 20 G | Refills: 1 | Status: SHIPPED | OUTPATIENT
Start: 2019-09-09 | End: 2022-03-15

## 2019-09-09 RX ORDER — KETOCONAZOLE 20 MG/G
CREAM TOPICAL DAILY
Qty: 30 G | Refills: 1 | Status: SHIPPED | OUTPATIENT
Start: 2019-09-09 | End: 2021-05-26 | Stop reason: CLARIF

## 2019-09-09 RX ORDER — IBUPROFEN 800 MG/1
800 TABLET ORAL 3 TIMES DAILY
Qty: 60 TABLET | Refills: 1 | Status: SHIPPED | OUTPATIENT
Start: 2019-09-09 | End: 2019-12-15 | Stop reason: ALTCHOICE

## 2019-09-25 ENCOUNTER — PATIENT MESSAGE (OUTPATIENT)
Dept: FAMILY MEDICINE | Facility: CLINIC | Age: 29
End: 2019-09-25

## 2019-09-25 DIAGNOSIS — N46.8 PRIMARY MALE INFERTILITY: Primary | ICD-10-CM

## 2019-09-29 ENCOUNTER — PATIENT OUTREACH (OUTPATIENT)
Dept: ADMINISTRATIVE | Facility: OTHER | Age: 29
End: 2019-09-29

## 2019-10-12 ENCOUNTER — HOSPITAL ENCOUNTER (EMERGENCY)
Facility: HOSPITAL | Age: 29
Discharge: HOME | End: 2019-10-12
Attending: EMERGENCY MEDICINE

## 2019-10-12 VITALS
TEMPERATURE: 98 F | SYSTOLIC BLOOD PRESSURE: 163 MMHG | OXYGEN SATURATION: 100 % | DIASTOLIC BLOOD PRESSURE: 101 MMHG | RESPIRATION RATE: 18 BRPM | WEIGHT: 227 LBS | HEIGHT: 72 IN | HEART RATE: 78 BPM | BODY MASS INDEX: 30.75 KG/M2

## 2019-10-12 DIAGNOSIS — L30.9 DERMATITIS: Primary | ICD-10-CM

## 2019-10-12 PROCEDURE — 99284 EMERGENCY DEPT VISIT MOD MDM: CPT | Mod: ER

## 2019-10-12 RX ORDER — METHYLPREDNISOLONE 4 MG/1
TABLET ORAL
Qty: 1 PACKAGE | Refills: 0 | Status: SHIPPED | OUTPATIENT
Start: 2019-10-12 | End: 2019-11-02

## 2019-10-12 RX ORDER — TRIAMCINOLONE ACETONIDE 1 MG/G
OINTMENT TOPICAL 2 TIMES DAILY
Qty: 30 G | Refills: 0 | Status: SHIPPED | OUTPATIENT
Start: 2019-10-12 | End: 2019-12-15

## 2019-10-12 RX ORDER — LORATADINE 10 MG/1
10 TABLET ORAL DAILY
Qty: 60 TABLET | Refills: 0 | COMMUNITY
Start: 2019-10-12 | End: 2019-12-15

## 2019-10-13 NOTE — ED PROVIDER NOTES
Encounter Date: 10/12/2019    SCRIBE #1 NOTE: I, Camilla Mendez, am scribing for, and in the presence of,  Dr. Mcnamara. I have scribed the following portions of the note - Other sections scribed: HPI, ROS, PE.       History     Chief Complaint   Patient presents with    Rash     to right side of neck, onset 2-3 days ago, itchy     Greg Castellanos is a 29 y.o. male who presents to the ED complaining of acute itchy rash to right side of neck x 3 days. Denies fever. No oral, chest, or palmar involvement of rash. Pt does not wear neck jewelry and has been going to the same lopez, but recently started using a new soap. He states that he has never had a rash like this before. Pt has been applying hydrocortisone cream to the area with minimal relief.    The history is provided by the patient. No  was used.     Review of patient's allergies indicates:   Allergen Reactions    Animal derived oil      Dander      Pollen extracts      Past Medical History:   Diagnosis Date    Bronchitis     Knee contusion     Peptic ulcer     Shingles     Shingles      Past Surgical History:   Procedure Laterality Date    CIRCUMCISION, PRIMARY      HYPOSPADIAS CORRECTION       Family History   Problem Relation Age of Onset    Cancer Maternal Grandfather     Diabetes Paternal Grandfather     No Known Problems Mother     No Known Problems Father     Lupus Maternal Aunt     Diabetes Maternal Uncle      Social History     Tobacco Use    Smoking status: Never Smoker    Smokeless tobacco: Never Used   Substance Use Topics    Alcohol use: Yes     Comment: occ    Drug use: No     Review of Systems   Constitutional: Negative for fever.   HENT: Negative for drooling.    Respiratory: Negative for shortness of breath.    Gastrointestinal: Negative for nausea and vomiting.   Skin: Positive for rash.   Neurological: Negative for syncope.   All other systems reviewed and are negative.      Physical Exam     Initial Vitals  [10/12/19 1843]   BP Pulse Resp Temp SpO2   (!) 172/115 75 19 98.2 °F (36.8 °C) 98 %      MAP       --         Physical Exam    Nursing note and vitals reviewed.  Constitutional: He appears well-developed and well-nourished.   HENT:   Head: Normocephalic and atraumatic.   Right Ear: External ear normal.   Left Ear: External ear normal.   Eyes: Conjunctivae are normal.   Neck: Normal range of motion and phonation normal. Neck supple.   Cardiovascular: Normal rate and intact distal pulses.   Pulmonary/Chest: Effort normal. No stridor. No respiratory distress.   Abdominal: Normal appearance.   Musculoskeletal: Normal range of motion. He exhibits no edema.   Neurological: He is alert and oriented to person, place, and time.   Skin: Skin is warm and dry. Rash noted. Rash is maculopapular. There is erythema.   Erythematous maculopapular rash to right side of neck, 4 cm in diameter. No oral or palmar involvement.   Psychiatric: He has a normal mood and affect. His behavior is normal.         ED Course   Procedures  Labs Reviewed - No data to display       Imaging Results    None          Medical Decision Making:   History:   Old Medical Records: I decided to obtain old medical records.    Labs Reviewed       Imaging Reviewed    Imaging Results    None         Medications given in ED    Medications - No data to display    This document was produced by a scribe under my direction and in my presence. I agree with the content of the note and have made any necessary edits.     Romel Mcnamara MD         Note was created using voice recognition software. Note may have occasional typographical errors that may not have been identified and edited despite good edis initial review prior to signing.            Scribe Attestation:   Scribe #1: I performed the above scribed service and the documentation accurately describes the services I performed. I attest to the accuracy of the note.      Discharge Medications     Medication List  with Changes/Refills   New Medications    LORATADINE (CLARITIN) 10 MG TABLET    Take 1 tablet (10 mg total) by mouth once daily.    METHYLPREDNISOLONE (MEDROL DOSEPACK) 4 MG TABLET    Take as directed    TRIAMCINOLONE ACETONIDE 0.1% (KENALOG) 0.1 % OINTMENT    Apply topically 2 (two) times daily.   Current Medications    ALBUTEROL (PROVENTIL/VENTOLIN HFA) 90 MCG/ACTUATION INHALER    Inhale 2 puffs into the lungs every 6 (six) hours as needed for Wheezing. Rescue    CETIRIZINE (ZYRTEC) 10 MG TABLET    Take 1 tablet (10 mg total) by mouth once daily.    FLUTICASONE PROPIONATE (FLONASE) 50 MCG/ACTUATION NASAL SPRAY    1 spray (50 mcg total) by Each Nostril route 2 (two) times daily.    HYDROCORTISONE 2.5 % OINTMENT    Apply topically daily as needed.    IBUPROFEN (ADVIL,MOTRIN) 800 MG TABLET    Take 1 tablet (800 mg total) by mouth 3 (three) times daily.    KETOCONAZOLE (NIZORAL) 2 % CREAM    Apply topically once daily.             Patient discharged to home in stable condition with instructions to:   1. Please take all meds as prescribed.  2. Follow-up with your primary care doctor   3. Return precautions discussed and patient and/or family/caretaker understands to return to the emergency room for any concerns including worsening of your current symptoms, fever, chills, night sweats, worsening pain, chest pain, shortness of breath, nausea, vomiting, diarrhea, bleeding, headache, difficulty talking, visual disturbances, weakness, numbness or any other acute concerns             Clinical Impression:       ICD-10-CM ICD-9-CM   1. Dermatitis L30.9 692.9         Disposition:   Disposition: Discharged  Condition: Stable                        Romel Mcnamara MD  10/12/19 2023

## 2019-11-15 ENCOUNTER — HOSPITAL ENCOUNTER (EMERGENCY)
Facility: HOSPITAL | Age: 29
Discharge: HOME | End: 2019-11-15
Attending: EMERGENCY MEDICINE
Payer: MEDICAID

## 2019-11-15 VITALS
TEMPERATURE: 98 F | HEART RATE: 85 BPM | BODY MASS INDEX: 31.14 KG/M2 | HEIGHT: 73 IN | RESPIRATION RATE: 18 BRPM | SYSTOLIC BLOOD PRESSURE: 182 MMHG | OXYGEN SATURATION: 100 % | DIASTOLIC BLOOD PRESSURE: 113 MMHG | WEIGHT: 235 LBS

## 2019-11-15 DIAGNOSIS — R03.0 ELEVATED BLOOD PRESSURE READING: ICD-10-CM

## 2019-11-15 DIAGNOSIS — S39.012A ACUTE MYOFASCIAL STRAIN OF LUMBOSACRAL REGION, INITIAL ENCOUNTER: Primary | ICD-10-CM

## 2019-11-15 PROCEDURE — 99284 EMERGENCY DEPT VISIT MOD MDM: CPT | Mod: ER

## 2019-11-15 RX ORDER — METHOCARBAMOL 750 MG/1
1500 TABLET, FILM COATED ORAL 3 TIMES DAILY
Qty: 30 TABLET | Refills: 0 | Status: SHIPPED | OUTPATIENT
Start: 2019-11-15 | End: 2019-11-20

## 2019-11-15 RX ORDER — MELOXICAM 15 MG/1
15 TABLET ORAL DAILY
Qty: 15 TABLET | Refills: 0 | Status: SHIPPED | OUTPATIENT
Start: 2019-11-15 | End: 2019-12-15
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
## 2019-11-17 ENCOUNTER — PATIENT OUTREACH (OUTPATIENT)
Dept: ADMINISTRATIVE | Facility: OTHER | Age: 29
End: 2019-11-17

## 2019-12-05 ENCOUNTER — PATIENT OUTREACH (OUTPATIENT)
Dept: ADMINISTRATIVE | Facility: OTHER | Age: 29
End: 2019-12-05

## 2019-12-15 ENCOUNTER — HOSPITAL ENCOUNTER (EMERGENCY)
Facility: HOSPITAL | Age: 29
Discharge: HOME | End: 2019-12-15
Attending: EMERGENCY MEDICINE
Payer: MEDICAID

## 2019-12-15 VITALS
HEART RATE: 85 BPM | OXYGEN SATURATION: 100 % | BODY MASS INDEX: 30.48 KG/M2 | TEMPERATURE: 98 F | HEIGHT: 73 IN | DIASTOLIC BLOOD PRESSURE: 103 MMHG | RESPIRATION RATE: 16 BRPM | WEIGHT: 230 LBS | SYSTOLIC BLOOD PRESSURE: 142 MMHG

## 2019-12-15 DIAGNOSIS — R10.9 LEFT FLANK PAIN: Primary | ICD-10-CM

## 2019-12-15 LAB
BILIRUB UR QL STRIP: NEGATIVE
CLARITY UR: CLEAR
COLOR UR: YELLOW
GLUCOSE UR QL STRIP: NEGATIVE
HGB UR QL STRIP: NEGATIVE
KETONES UR QL STRIP: NEGATIVE
LEUKOCYTE ESTERASE UR QL STRIP: NEGATIVE
MICROSCOPIC COMMENT: NORMAL
NITRITE UR QL STRIP: NEGATIVE
PH UR STRIP: 6 [PH] (ref 5–8)
PROT UR QL STRIP: NEGATIVE
SP GR UR STRIP: 1.02 (ref 1–1.03)
URN SPEC COLLECT METH UR: NORMAL
UROBILINOGEN UR STRIP-ACNC: NEGATIVE EU/DL

## 2019-12-15 PROCEDURE — 81000 URINALYSIS NONAUTO W/SCOPE: CPT

## 2019-12-15 PROCEDURE — 99284 EMERGENCY DEPT VISIT MOD MDM: CPT | Mod: 25

## 2019-12-15 RX ORDER — METHOCARBAMOL 500 MG/1
1000 TABLET, FILM COATED ORAL 3 TIMES DAILY PRN
Qty: 30 TABLET | Refills: 0 | Status: SHIPPED | OUTPATIENT
Start: 2019-12-15 | End: 2019-12-20

## 2019-12-15 RX ORDER — LIDOCAINE 50 MG/G
1 PATCH TOPICAL DAILY
Qty: 20 PATCH | Refills: 0 | Status: SHIPPED | OUTPATIENT
Start: 2019-12-15 | End: 2021-05-26 | Stop reason: CLARIF

## 2019-12-15 RX ORDER — DICLOFENAC SODIUM 10 MG/G
2 GEL TOPICAL 4 TIMES DAILY
Qty: 100 G | Refills: 1 | Status: SHIPPED | OUTPATIENT
Start: 2019-12-15 | End: 2021-05-26 | Stop reason: CLARIF

## 2019-12-15 NOTE — DISCHARGE INSTRUCTIONS
Please follow up with your PCP within the next 1 week for further evaluation and instructions.  Please return for new or worsening symptoms such as fever, chills, severe worsening of pain. Please take medications as prescribed.
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
## 2019-12-23 ENCOUNTER — PATIENT OUTREACH (OUTPATIENT)
Dept: ADMINISTRATIVE | Facility: OTHER | Age: 29
End: 2019-12-23

## 2019-12-24 ENCOUNTER — OFFICE VISIT (OUTPATIENT)
Dept: UROLOGY | Facility: CLINIC | Age: 29
End: 2019-12-24
Payer: MEDICAID

## 2019-12-24 VITALS — BODY MASS INDEX: 29.9 KG/M2 | WEIGHT: 226.63 LBS

## 2019-12-24 DIAGNOSIS — N46.8 PRIMARY MALE INFERTILITY: Primary | ICD-10-CM

## 2019-12-24 DIAGNOSIS — N53.19 EJACULATORY DISORDER: ICD-10-CM

## 2019-12-24 LAB
BILIRUB SERPL-MCNC: NORMAL MG/DL
BLOOD URINE, POC: NORMAL
COLOR, POC UA: YELLOW
GLUCOSE UR QL STRIP: NORMAL
KETONES UR QL STRIP: NORMAL
LEUKOCYTE ESTERASE URINE, POC: NORMAL
NITRITE, POC UA: NORMAL
PH, POC UA: 6
PROTEIN, POC: NORMAL
SPECIFIC GRAVITY, POC UA: 1015
UROBILINOGEN, POC UA: NORMAL

## 2019-12-24 PROCEDURE — 99999 PR PBB SHADOW E&M-EST. PATIENT-LVL III: CPT | Mod: PBBFAC,,, | Performed by: UROLOGY

## 2019-12-24 PROCEDURE — 81001 URINALYSIS AUTO W/SCOPE: CPT | Mod: PBBFAC | Performed by: UROLOGY

## 2019-12-24 PROCEDURE — 99213 OFFICE O/P EST LOW 20 MIN: CPT | Mod: PBBFAC | Performed by: UROLOGY

## 2019-12-24 PROCEDURE — 99999 PR PBB SHADOW E&M-EST. PATIENT-LVL III: ICD-10-PCS | Mod: PBBFAC,,, | Performed by: UROLOGY

## 2019-12-24 PROCEDURE — 99214 PR OFFICE/OUTPT VISIT, EST, LEVL IV, 30-39 MIN: ICD-10-PCS | Mod: S$PBB,,, | Performed by: UROLOGY

## 2019-12-24 PROCEDURE — 99214 OFFICE O/P EST MOD 30 MIN: CPT | Mod: S$PBB,,, | Performed by: UROLOGY

## 2019-12-24 NOTE — LETTER
December 24, 2019      Lai St MD  [address] [facility]  [place] [address]           [facility]  [address]  [facility] [place] [address]  Phone: [phone]  Fax: [phone]          Patient: Greg Castellanos   MR Number: [id number]   YOB: 1990   Date of Visit: 12/24/2019       Dear Dr. Lai St:    Thank you for referring Greg Castellanos to me for evaluation. Attached you will find relevant portions of my assessment and plan of care.    If you have questions, please do not hesitate to call me. I look forward to following Greg Castellanos along with you.    Sincerely,    JANINA Moseley MD    Enclosure  CC:  No Recipients    If you would like to receive this communication electronically, please contact [email or web address] or [phone] to request more information on [facility] Link access.    For providers and/or their staff who would like to refer a patient to [facility], please contact us through our one-stop-shop provider referral line, [facility], at [phone].    If you feel you have received this communication in error or would no longer like to receive these types of communications, please e-mail [email or web address]

## 2019-12-24 NOTE — PROGRESS NOTES
"Subjective:       Patient ID: Greg Castellanos is a 29 y.o. male who was last seen in this office Visit date not found    Chief Complaint:   Chief Complaint   Patient presents with    Infertility     pt here today for infertility        Primary Male Infertility  This is a 29 year old male with a 27 year old wife.  She has 2 children from a different partner.  He has never produced a pregnancy.  He works as a .  He denies any family history of fertility problems. He denies any childhood mumps or other testicular infections.  He denies trauma or sporting accidents.  He voids with a normal stream.  He has normal ejaculation with masturbation.  He has to been unable to ejaculate during intercourse.  He feels this is stress related and is very frustrating.  He feels that his erections are straight and and no problem until he tries to orgasm with intercourse.  He mentioned today some kind of surgery on his penis, possibly hypospadias repair but also related to kidney stones.  He voids with a good stream.      ACTIVE MEDICAL ISSUES:  Patient Active Problem List   Diagnosis    Shoulder strain    Wrist pain, acute    Epigastric pain    Fertility testing    Male fertility problem       ALLERGIES AND MEDICATIONS: updated and reviewed.  Review of patient's allergies indicates:   Allergen Reactions    Animal derived oil      Dander      Naproxen Other (See Comments)     "I had a bleeding stomach ulcer"    Pollen extracts      Current Outpatient Medications   Medication Sig    albuterol (PROVENTIL/VENTOLIN HFA) 90 mcg/actuation inhaler Inhale 2 puffs into the lungs every 6 (six) hours as needed for Wheezing. Rescue    diclofenac sodium (VOLTAREN) 1 % Gel Apply 2 g topically 4 (four) times daily.    fluticasone propionate (FLONASE) 50 mcg/actuation nasal spray 1 spray (50 mcg total) by Each Nostril route 2 (two) times daily.    lidocaine (LIDODERM) 5 % Place 1 patch onto the skin once daily. Remove & " Discard patch within 12 hours or as directed by MD    cetirizine (ZYRTEC) 10 MG tablet Take 1 tablet (10 mg total) by mouth once daily.    hydrocortisone 2.5 % ointment Apply topically daily as needed.    ketoconazole (NIZORAL) 2 % cream Apply topically once daily. (Patient not taking: Reported on 12/24/2019)     No current facility-administered medications for this visit.        Review of Systems   Constitutional: Negative for activity change, fatigue, fever and unexpected weight change.   HENT: Negative for congestion.    Eyes: Negative for redness.   Respiratory: Negative for chest tightness and shortness of breath.    Cardiovascular: Negative for chest pain and leg swelling.   Gastrointestinal: Negative for abdominal pain, constipation, diarrhea, nausea and vomiting.   Genitourinary: Negative for dysuria, flank pain, frequency, hematuria, penile pain, penile swelling, scrotal swelling, testicular pain and urgency.   Musculoskeletal: Negative for arthralgias and back pain.   Neurological: Negative for dizziness and light-headedness.   Psychiatric/Behavioral: Negative for behavioral problems and confusion. The patient is not nervous/anxious.    All other systems reviewed and are negative.      Objective:      Vitals:    12/24/19 1120   Weight: 102.8 kg (226 lb 10.1 oz)     Physical Exam   Nursing note and vitals reviewed.  Constitutional: He is oriented to person, place, and time. He appears well-developed and well-nourished.   HENT:   Head: Normocephalic.   Eyes: Conjunctivae are normal.   Neck: Normal range of motion. Neck supple. No tracheal deviation present. No thyromegaly present.   Cardiovascular: Normal rate and normal heart sounds.    Pulmonary/Chest: Effort normal and breath sounds normal. No respiratory distress. He has no wheezes.   Abdominal: Soft. Bowel sounds are normal. There is no hepatosplenomegaly. There is no tenderness. There is no rebound and no CVA tenderness. No hernia.   Genitourinary:  Testes normal and penis normal. Right testis shows no mass and no tenderness. Left testis shows no mass and no tenderness. Circumcised.   Genitourinary Comments: Meatus appears normal   Musculoskeletal: Normal range of motion. He exhibits no edema or tenderness.   Lymphadenopathy:     He has no cervical adenopathy.   Neurological: He is alert and oriented to person, place, and time.   Skin: Skin is warm and dry. No rash noted. No erythema.     Psychiatric: He has a normal mood and affect. His behavior is normal. Judgment and thought content normal.       Urine dipstick shows negative for all components.  Micro exam: negative for WBC's or RBC's.    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 Ejaculatory disorder          Plan:       1. Primary male infertility    - Testosterone; Future  - US Scrotum And Testicles; Future  - Follicle stimulating hormone; Future  - Luteinizing hormone; Future  - Prolactin; Future  - ESTRADIOL; Future  - Semen analysis; Future  - POCT urinalysis, dipstick or tablet reag    2. Ejaculatory disorder  He may need a cystoscopy to evaluate for stricture given his history of urethral surgery as a child.    - Ambulatory consult to Psychology            Follow up in about 6 weeks (around 2/4/2020) for Follow up, Review X-ray, Review labs.

## 2020-01-10 ENCOUNTER — HOSPITAL ENCOUNTER (OUTPATIENT)
Dept: RADIOLOGY | Facility: HOSPITAL | Age: 30
Discharge: HOME | End: 2020-01-10
Attending: UROLOGY
Payer: MEDICAID

## 2020-01-10 DIAGNOSIS — N46.8 PRIMARY MALE INFERTILITY: ICD-10-CM

## 2020-01-10 PROCEDURE — 76870 US SCROTUM AND TESTICLES: ICD-10-PCS | Mod: 26,,, | Performed by: RADIOLOGY

## 2020-01-10 PROCEDURE — 76870 US EXAM SCROTUM: CPT | Mod: 26,,, | Performed by: RADIOLOGY

## 2020-01-10 PROCEDURE — 76870 US EXAM SCROTUM: CPT | Mod: TC

## 2020-01-11 ENCOUNTER — PATIENT MESSAGE (OUTPATIENT)
Dept: FAMILY MEDICINE | Facility: CLINIC | Age: 30
End: 2020-01-11

## 2020-01-11 DIAGNOSIS — M54.50 CHRONIC MIDLINE LOW BACK PAIN WITHOUT SCIATICA: Primary | ICD-10-CM

## 2020-01-11 DIAGNOSIS — G89.29 CHRONIC MIDLINE LOW BACK PAIN WITHOUT SCIATICA: Primary | ICD-10-CM

## 2020-01-22 ENCOUNTER — TELEPHONE (OUTPATIENT)
Dept: ADMINISTRATIVE | Facility: HOSPITAL | Age: 30
End: 2020-01-22

## 2020-08-14 DIAGNOSIS — Z11.59 NEED FOR HEPATITIS C SCREENING TEST: ICD-10-CM

## 2020-10-05 ENCOUNTER — PATIENT MESSAGE (OUTPATIENT)
Dept: ADMINISTRATIVE | Facility: HOSPITAL | Age: 30
End: 2020-10-05

## 2020-11-15 ENCOUNTER — HOSPITAL ENCOUNTER (EMERGENCY)
Facility: HOSPITAL | Age: 30
Discharge: HOME | End: 2020-11-15
Attending: EMERGENCY MEDICINE
Payer: MEDICAID

## 2020-11-15 VITALS
RESPIRATION RATE: 16 BRPM | SYSTOLIC BLOOD PRESSURE: 158 MMHG | DIASTOLIC BLOOD PRESSURE: 102 MMHG | BODY MASS INDEX: 29.69 KG/M2 | TEMPERATURE: 98 F | OXYGEN SATURATION: 100 % | HEART RATE: 80 BPM | WEIGHT: 225 LBS

## 2020-11-15 DIAGNOSIS — R68.84 JAW PAIN: ICD-10-CM

## 2020-11-15 DIAGNOSIS — H00.025 HORDEOLUM INTERNUM OF LEFT LOWER EYELID: Primary | ICD-10-CM

## 2020-11-15 DIAGNOSIS — I10 HYPERTENSION, UNSPECIFIED TYPE: ICD-10-CM

## 2020-11-15 LAB
ALBUMIN SERPL-MCNC: 3.8 G/DL (ref 3.3–5.5)
ALP SERPL-CCNC: 60 U/L (ref 42–141)
BILIRUB SERPL-MCNC: 0.6 MG/DL (ref 0.2–1.6)
BUN SERPL-MCNC: 12 MG/DL (ref 7–22)
CALCIUM SERPL-MCNC: 9.7 MG/DL (ref 8–10.3)
CHLORIDE SERPL-SCNC: 104 MMOL/L (ref 98–108)
CREAT SERPL-MCNC: 1 MG/DL (ref 0.6–1.2)
GLUCOSE SERPL-MCNC: 89 MG/DL (ref 73–118)
POC ALT (SGPT): 57 U/L (ref 10–47)
POC AST (SGOT): 38 U/L (ref 11–38)
POC TCO2: 30 MMOL/L (ref 18–33)
POTASSIUM BLD-SCNC: 4.2 MMOL/L (ref 3.6–5.1)
PROTEIN, POC: 6.9 G/DL (ref 6.4–8.1)
SODIUM BLD-SCNC: 144 MMOL/L (ref 128–145)

## 2020-11-15 PROCEDURE — 80053 COMPREHEN METABOLIC PANEL: CPT | Mod: ER

## 2020-11-15 PROCEDURE — 85025 COMPLETE CBC W/AUTO DIFF WBC: CPT | Mod: ER

## 2020-11-15 PROCEDURE — 25000003 PHARM REV CODE 250: Mod: ER | Performed by: PHYSICIAN ASSISTANT

## 2020-11-15 PROCEDURE — 99283 EMERGENCY DEPT VISIT LOW MDM: CPT | Mod: ER

## 2020-11-15 RX ORDER — HYDRALAZINE HYDROCHLORIDE 25 MG/1
25 TABLET, FILM COATED ORAL
Status: COMPLETED | OUTPATIENT
Start: 2020-11-15 | End: 2020-11-15

## 2020-11-15 RX ADMIN — HYDRALAZINE HYDROCHLORIDE 25 MG: 25 TABLET, FILM COATED ORAL at 06:11

## 2020-11-16 ENCOUNTER — PES CALL (OUTPATIENT)
Dept: ADMINISTRATIVE | Facility: CLINIC | Age: 30
End: 2020-11-16

## 2020-11-16 NOTE — ED PROVIDER NOTES
"Encounter Date: 11/15/2020    SCRIBE #1 NOTE: I, Alis Martini, am scribing for, and in the presence of,  ADRIAN Gloria. I have scribed the following portions of the note - Other sections scribed: HPI, ROS, PE.       History     Chief Complaint   Patient presents with    Stye     Pt states," I have a sore spot under my left eye and my left jaw hurts."    Jaw Pain     Greg Castellanos is a 30 y.o. male who presents to the ED complaining of left-sided jaw pain that radiates from his left temple x 2 days. States he was recently lifting a 250 pound table with his golden and thinks he bit down too hard. Endorses soreness when he bites down. Also complains of eye pain and swelling x waking up this morning. Denies taking medications for his symptoms.      The history is provided by the patient. No  was used.     Review of patient's allergies indicates:   Allergen Reactions    Animal derived oil      Dander      Naproxen Other (See Comments)     "I had a bleeding stomach ulcer"    Pollen extracts      Past Medical History:   Diagnosis Date    Bronchitis     Knee contusion     Peptic ulcer     Shingles     Shingles      Past Surgical History:   Procedure Laterality Date    CIRCUMCISION, PRIMARY      HYPOSPADIAS CORRECTION       Family History   Problem Relation Age of Onset    Cancer Maternal Grandfather     Diabetes Paternal Grandfather     No Known Problems Mother     No Known Problems Father     Lupus Maternal Aunt     Diabetes Maternal Uncle      Social History     Tobacco Use    Smoking status: Never Smoker    Smokeless tobacco: Never Used   Substance Use Topics    Alcohol use: Yes     Comment: occ    Drug use: No     Review of Systems   Constitutional: Negative for fever.   HENT:        Positive jaw pain   Eyes: Positive for pain.        Positive eye swelling   Skin: Negative for rash.   All other systems reviewed and are negative.      Physical Exam     Initial Vitals " [11/15/20 1757]   BP Pulse Resp Temp SpO2   (!) 185/116 80 16 98 °F (36.7 °C) 100 %      MAP       --         Physical Exam    Nursing note and vitals reviewed.  Constitutional: He appears well-developed and well-nourished. No distress.   HENT:   Head: Normocephalic and atraumatic.   Right Ear: Tympanic membrane, external ear and ear canal normal.   Left Ear: Tympanic membrane, external ear and ear canal normal.   No jaw tenderness or swelling. No gingival tenderness or swelling.   Eyes: Conjunctivae are normal.   Small stye on internal left lower eyelid. Mild lid edema and erythema.   Neck: Normal range of motion. Neck supple.   Cardiovascular: Normal rate and regular rhythm.   Pulmonary/Chest: No respiratory distress.   Abdominal: Soft. There is no abdominal tenderness.   Musculoskeletal: Normal range of motion.   Neurological: He is alert and oriented to person, place, and time.   Skin: Skin is warm and dry. No rash noted.   Psychiatric: He has a normal mood and affect. His behavior is normal.         ED Course   Procedures  Labs Reviewed   POCT CMP - Abnormal; Notable for the following components:       Result Value    ALT (SGPT), POC 57 (*)     All other components within normal limits   POCT CBC   POCT CMP              Imaging Results    None          Medical Decision Making:   History:   Old Medical Records: I decided to obtain old medical records.  Clinical Tests:   Lab Tests: Reviewed and Ordered  ED Management:  29 y/o male with left eye pain, exam revealing internal stye. No evidence of orbital cellulitis or FB. Also mild jaw pain with minimal tenderness and no swelling. Low suspicion for dental infection. His BP is elevated, with no hx of HTN. Given hydralazine and checked labs. Renal function normal. I do not suspect angina or HTN emergency. Will d/c to f/u with PCP.            Scribe Attestation:   Scribe #1: I performed the above scribed service and the documentation accurately describes the services  I performed. I attest to the accuracy of the note.     I, Rd Romero, personally performed the services described in this documentation. All medical record entries made by the scribe were at my direction and in my presence.  I have reviewed the chart and agree that the record reflects my personal performance and is accurate and complete                    Clinical Impression:     ICD-10-CM ICD-9-CM   1. Hordeolum internum of left lower eyelid  H00.025 373.12   2. Jaw pain  R68.84 784.92   3. Hypertension, unspecified type  I10 401.9                          ED Disposition Condition    Discharge Stable        ED Prescriptions     None        Follow-up Information     Follow up With Specialties Details Why Contact Info    Lai St MD Internal Medicine, Wound Care   [address] [facility] [address]  [phone]      [facility] Emergency Department Emergency Medicine Go to  If symptoms worsen [address] [facility] [id number] [phone]                                       Rd Romero, PA-C  11/15/20 3230

## 2020-11-20 ENCOUNTER — HOSPITAL ENCOUNTER (EMERGENCY)
Facility: HOSPITAL | Age: 30
Discharge: HOME | End: 2020-11-20
Attending: EMERGENCY MEDICINE
Payer: MEDICAID

## 2020-11-20 VITALS
DIASTOLIC BLOOD PRESSURE: 96 MMHG | OXYGEN SATURATION: 99 % | RESPIRATION RATE: 18 BRPM | HEART RATE: 81 BPM | BODY MASS INDEX: 31.83 KG/M2 | SYSTOLIC BLOOD PRESSURE: 161 MMHG | TEMPERATURE: 99 F | HEIGHT: 72 IN | WEIGHT: 235 LBS

## 2020-11-20 DIAGNOSIS — H00.025 HORDEOLUM INTERNUM OF LEFT LOWER EYELID: Primary | ICD-10-CM

## 2020-11-20 PROCEDURE — 99282 EMERGENCY DEPT VISIT SF MDM: CPT | Mod: ER

## 2020-11-21 NOTE — ED PROVIDER NOTES
"Encounter Date: 11/20/2020       History     Chief Complaint   Patient presents with    Stye     pt c/o stye to left eyelid x 4 days. pt reports he went to his eye doctor 4 days ago, was prescribed medicine for stye but denies relief.     A 30 years old male who presents to the ED for stye to left eye for 4 days. Pt states he was seen by his eye doctor and was given erythromycin and oral antibiotics. He states the stye is coming to a head and he wants to get it drainage. He is afraid if it burst it may affect his vision. Pt has no complaints of visual changes, jaw pain, or chests today.    The history is provided by the patient.   Eye Pain   This is a new (stye to left eye ) problem. The current episode started several days ago. The problem has been gradually improving. The left eye is affected. The pain is at a severity of 2/10. Pertinent negatives include no discharge, no double vision, no photophobia, no eye redness, no nausea, no vomiting and no weakness.     Review of patient's allergies indicates:   Allergen Reactions    Animal derived oil      Dander      Naproxen Other (See Comments)     "I had a bleeding stomach ulcer"    Pollen extracts      Past Medical History:   Diagnosis Date    Bronchitis     Knee contusion     Peptic ulcer     Shingles     Shingles      Past Surgical History:   Procedure Laterality Date    CIRCUMCISION, PRIMARY      HYPOSPADIAS CORRECTION       Family History   Problem Relation Age of Onset    Cancer Maternal Grandfather     Diabetes Paternal Grandfather     No Known Problems Mother     No Known Problems Father     Lupus Maternal Aunt     Diabetes Maternal Uncle      Social History     Tobacco Use    Smoking status: Never Smoker    Smokeless tobacco: Never Used   Substance Use Topics    Alcohol use: Yes     Comment: occ    Drug use: No     Review of Systems   Constitutional: Negative.  Negative for activity change.   HENT: Negative for congestion.         Jaw " pain   Eyes: Negative for double vision, photophobia, pain, discharge, redness and visual disturbance.        Left eyelid stye    Respiratory: Negative.  Negative for shortness of breath.    Cardiovascular: Negative.  Negative for chest pain.   Gastrointestinal: Negative.  Negative for abdominal pain, nausea and vomiting.   Genitourinary: Negative.  Negative for dysuria.   Musculoskeletal: Negative.    Skin: Negative.    Neurological: Negative.  Negative for weakness.   Hematological: Does not bruise/bleed easily.   Psychiatric/Behavioral: Negative.    All other systems reviewed and are negative.      Physical Exam     Initial Vitals [11/20/20 1953]   BP Pulse Resp Temp SpO2   (!) 161/96 81 18 98.6 °F (37 °C) 99 %      MAP       --         Physical Exam    Nursing note and vitals reviewed.  Constitutional: Vital signs are normal. He appears well-developed. He is cooperative.   HENT:   Head: Normocephalic.   Right Ear: Tympanic membrane and external ear normal.   Left Ear: Tympanic membrane and external ear normal.   Nose: Nose normal.   Mouth/Throat: Uvula is midline, oropharynx is clear and moist and mucous membranes are normal.   Eyes: Conjunctivae, EOM and lids are normal. Pupils are equal, round, and reactive to light.   Left lower eyelid with small internal stye. No erythema or swelling noted to periorbital region.   Neck: Normal range of motion. Neck supple.   Cardiovascular: Normal rate, regular rhythm, S1 normal, S2 normal and normal heart sounds.   Pulmonary/Chest: Effort normal and breath sounds normal.   Abdominal: Soft. Normal appearance. There is no abdominal tenderness.   Musculoskeletal: Normal range of motion.   Neurological: He is alert and oriented to person, place, and time.   Skin: Skin is warm, dry and intact.   Psychiatric: He has a normal mood and affect. His speech is normal and behavior is normal.         ED Course   Procedures  Labs Reviewed - No data to display       Imaging Results     None          Medical Decision Making:   Initial Assessment:   A 30 years old male who presents to the ED for stye to left eye for 4 days. Pt states he was seen by his eye doctor and was given erythromycin and oral antibiotics. He states the stye is coming to a head and he wants to get it drainage. He is afraid if it burst it may affect his vision. Pt has no complaints of visual changes, jaw pain, or chests today.    Differential Diagnosis:   Stye, Eyelid abscess, Periorbital cellulitis  ED Management:  Physical exam performed.  Pt instructed to continue erythromycin ointment, oral antibiotics, warm compresses, use baby shampoo to cleanse.  Follow-up with eye doctor in 3 days.   Return to emergency room for worsening of symptoms.  Follow-up with PCP ASAP. Monitor your blood pressure. Pt verbalized understanding.                             Clinical Impression:     ICD-10-CM ICD-9-CM   1. Hordeolum internum of left lower eyelid  H00.025 373.12                          ED Disposition Condition    Discharge Stable        ED Prescriptions     None        Follow-up Information     Follow up With Specialties Details Why Contact Info        Follow-up with eye doctor in 3 days.                                       DIONTE Bear  11/21/20 1824       DIONTE Bear  11/21/20 1824

## 2020-11-21 NOTE — DISCHARGE INSTRUCTIONS
Follow-up with eye doctor in 3 days.   Wash eyelid with baby shampoo and warm water three times a days.

## 2020-11-23 ENCOUNTER — HOSPITAL ENCOUNTER (EMERGENCY)
Facility: HOSPITAL | Age: 30
Discharge: HOME | End: 2020-11-23
Attending: EMERGENCY MEDICINE
Payer: MEDICAID

## 2020-11-23 VITALS
BODY MASS INDEX: 31.14 KG/M2 | TEMPERATURE: 99 F | HEIGHT: 73 IN | WEIGHT: 235 LBS | HEART RATE: 87 BPM | DIASTOLIC BLOOD PRESSURE: 103 MMHG | OXYGEN SATURATION: 100 % | SYSTOLIC BLOOD PRESSURE: 158 MMHG | RESPIRATION RATE: 18 BRPM

## 2020-11-23 DIAGNOSIS — H00.035 ABSCESS OF LEFT LOWER EYELID: Primary | ICD-10-CM

## 2020-11-23 PROCEDURE — 10160 PNXR ASPIR ABSC HMTMA BULLA: CPT

## 2020-11-23 PROCEDURE — 99283 EMERGENCY DEPT VISIT LOW MDM: CPT | Mod: 25

## 2020-11-23 PROCEDURE — 67700 BLEPHAROTOMY DRG ABSC EYELID: CPT

## 2020-11-23 RX ORDER — ERYTHROMYCIN 5 MG/G
OINTMENT OPHTHALMIC
Qty: 1 TUBE | Refills: 0 | Status: SHIPPED | OUTPATIENT
Start: 2020-11-23 | End: 2021-05-26 | Stop reason: CLARIF

## 2020-11-23 NOTE — DISCHARGE INSTRUCTIONS
Please return immediately if you get worse or if new problems develop.  Please follow-up with the ophthalmologist above, this week.  Hot compresses left eye, 5 min, every 6 hr, for 2 days.  Erythromycin as directed

## 2020-11-23 NOTE — ED TRIAGE NOTES
Patient presents with a stye on left lower eyelid. Noticed it about 4 days ago and has been getting bigger each day. Rates pain 9/10 in left eye. No drainage. Denies fever, chills, n/v/d.

## 2020-11-23 NOTE — ED PROVIDER NOTES
"Encounter Date: 11/23/2020    SCRIBE #1 NOTE: I, Grace Hollingsworth, am scribing for, and in the presence of,  Shawn So MD. I have scribed the following portions of the note - Other sections scribed: HPI, ROS.       History     Chief Complaint   Patient presents with    Eye Problem     left eye sty started x 4 days     CC: Eye Problem    HPI: This 30 y.o male, with a medical history of bronchitis, peptic ulcer, and shingles, presents to the ED c/o an acute, constant, severe (9/10) mass to the left lower eyelid for the last 4-5 days. Pt reports also experiencing left sided jaw pain, which began after clinching his jaw while lifting a 200 lb marble table a few days ago. He notes that the pain is improving. Pt denies chest pain, cough, shortness of breath, fever, chills, sweats, headache, ear pain, sore throat, abdominal pain, emesis, diarrhea or dysuria. No other associated symptoms.     The history is provided by the patient.     Review of patient's allergies indicates:   Allergen Reactions    Animal derived oil      Dander      Naproxen Other (See Comments)     "I had a bleeding stomach ulcer"    Pollen extracts      Past Medical History:   Diagnosis Date    Bronchitis     Knee contusion     Peptic ulcer     Shingles     Shingles      Past Surgical History:   Procedure Laterality Date    CIRCUMCISION, PRIMARY      HYPOSPADIAS CORRECTION       Family History   Problem Relation Age of Onset    Cancer Maternal Grandfather     Diabetes Paternal Grandfather     No Known Problems Mother     No Known Problems Father     Lupus Maternal Aunt     Diabetes Maternal Uncle      Social History     Tobacco Use    Smoking status: Never Smoker    Smokeless tobacco: Never Used   Substance Use Topics    Alcohol use: Yes     Comment: occ    Drug use: No     Review of Systems   Constitutional: Negative for chills, diaphoresis and fever.   HENT: Negative for ear pain and sore throat.         (+) left sided jaw " pain (improving)   Eyes:        (+) mass to the left lower eyelid   Respiratory: Negative for shortness of breath.    Cardiovascular: Negative for chest pain.   Gastrointestinal: Negative for nausea.   Genitourinary: Negative for dysuria.   Musculoskeletal: Negative for back pain.   Skin: Negative for rash.   Neurological: Negative for weakness.   Hematological: Does not bruise/bleed easily.       Physical Exam     Initial Vitals   BP Pulse Resp Temp SpO2   11/23/20 0916 11/23/20 0913 11/23/20 0913 11/23/20 0913 11/23/20 0913   (!) 158/103 87 18 98.6 °F (37 °C) 100 %      MAP       --                Physical Exam  The patient was examined specifically for the following:   General:No significant distress, Good color, Warm and dry. Head and neck:Scalp atraumatic, Neck supple. Neurological:Appropriate conversation, Gross motor deficits. Eyes:Conjugate gaze, Clear corneas. ENT: No epistaxis. Cardiac: Regular rate and rhythm, Grossly normal heart tones. Pulmonary: Wheezing, Rales. Gastrointestinal: Abdominal tenderness, Abdominal distention. Musculoskeletal: Extremity deformity, Apparent pain with range of motion of the joints. Skin: Rash.   The findings on examination were normal except for the following:  Patient has a small abscess on the lid margin of the left lower eyelid.  ED Course   I & D - Incision and Drainage    Date/Time: 11/23/2020 9:45 AM  Location procedure was performed: Health system EMERGENCY DEPARTMENT  Performed by: Shawn So MD  Authorized by: Shawn So MD   Type: abscess  Body area: head/neck  Location details: left eyelid  Description of findings: 4 mm abscess left lower eyelid.   Incision type: single straight  Drainage: pus  Drainage amount: scant  Wound treatment: incision  Packing material: none  Technical procedures used: An 18 gauge needle was used to incise and drain the abscess  Complications: No  Specimens: No  Implants: No        Labs Reviewed - No data to display       Imaging  Results    None       Medical decision making:  Given the above this patient has a small abscess on his lid margin.  Was incise and drain.  I will discharge this patient outpatient evaluation and treatment.  I will advise warm compresses.  I will have him follow-up with ophthalmology.                                 Clinical Impression:     ICD-10-CM ICD-9-CM   1. Abscess of left lower eyelid  H00.035 373.13                          ED Disposition Condition    Discharge Stable        ED Prescriptions     Medication Sig Dispense Start Date End Date Auth. Provider    erythromycin (ROMYCIN) ophthalmic ointment Place a 1/2 inch ribbon of ointment into the left lower eyelid 1 Tube 11/23/2020  Shawn So MD        Follow-up Information     Follow up With Specialties Details Why Contact Info    Kannan Booth MD Ophthalmology In 3 days  [address] [id number] [phone]                        I personally performed the services described in this documentation.  All medical record  entries made by the scribe are at my direction and in my presence.  Signed, Dr. Saray So MD  11/24/20 0703       Shawn So MD  12/13/20 1948

## 2020-11-27 ENCOUNTER — PATIENT OUTREACH (OUTPATIENT)
Dept: EMERGENCY MEDICINE | Facility: HOSPITAL | Age: 30
End: 2020-11-27

## 2020-12-02 NOTE — PROGRESS NOTES
Patient denied any needs at this time.    Saray Hutchinson, ED Navigator, [facility]  [phone], ext. [phone]

## 2021-01-04 ENCOUNTER — PATIENT MESSAGE (OUTPATIENT)
Dept: ADMINISTRATIVE | Facility: HOSPITAL | Age: 31
End: 2021-01-04

## 2021-03-01 ENCOUNTER — PATIENT MESSAGE (OUTPATIENT)
Dept: UROLOGY | Facility: CLINIC | Age: 31
End: 2021-03-01

## 2021-03-22 ENCOUNTER — OFFICE VISIT (OUTPATIENT)
Dept: UROLOGY | Facility: CLINIC | Age: 31
End: 2021-03-22
Payer: MEDICAID

## 2021-03-22 VITALS — WEIGHT: 242.5 LBS | HEIGHT: 73 IN | BODY MASS INDEX: 32.14 KG/M2

## 2021-03-22 DIAGNOSIS — N53.19 EJACULATORY DISORDER: ICD-10-CM

## 2021-03-22 DIAGNOSIS — N46.8 PRIMARY MALE INFERTILITY: Primary | ICD-10-CM

## 2021-03-22 PROCEDURE — 99213 PR OFFICE/OUTPT VISIT, EST, LEVL III, 20-29 MIN: ICD-10-PCS | Mod: S$PBB,,, | Performed by: UROLOGY

## 2021-03-22 PROCEDURE — 99999 PR PBB SHADOW E&M-EST. PATIENT-LVL III: ICD-10-PCS | Mod: PBBFAC,,, | Performed by: UROLOGY

## 2021-03-22 PROCEDURE — 99999 PR PBB SHADOW E&M-EST. PATIENT-LVL III: CPT | Mod: PBBFAC,,, | Performed by: UROLOGY

## 2021-03-22 PROCEDURE — 99213 OFFICE O/P EST LOW 20 MIN: CPT | Mod: PBBFAC | Performed by: UROLOGY

## 2021-03-22 PROCEDURE — 99213 OFFICE O/P EST LOW 20 MIN: CPT | Mod: S$PBB,,, | Performed by: UROLOGY

## 2021-03-22 PROCEDURE — 81001 URINALYSIS AUTO W/SCOPE: CPT | Mod: PBBFAC | Performed by: UROLOGY

## 2021-04-05 ENCOUNTER — LAB VISIT (OUTPATIENT)
Dept: LAB | Facility: HOSPITAL | Age: 31
End: 2021-04-05
Attending: UROLOGY
Payer: MEDICAID

## 2021-04-05 DIAGNOSIS — N46.8 PRIMARY MALE INFERTILITY: ICD-10-CM

## 2021-04-05 LAB — TESTOST SERPL-MCNC: 367 NG/DL (ref 304–1227)

## 2021-04-05 PROCEDURE — 36415 COLL VENOUS BLD VENIPUNCTURE: CPT | Performed by: UROLOGY

## 2021-04-05 PROCEDURE — 84403 ASSAY OF TOTAL TESTOSTERONE: CPT | Performed by: UROLOGY

## 2021-04-06 ENCOUNTER — PATIENT MESSAGE (OUTPATIENT)
Dept: ADMINISTRATIVE | Facility: HOSPITAL | Age: 31
End: 2021-04-06

## 2021-04-27 ENCOUNTER — HOSPITAL ENCOUNTER (EMERGENCY)
Facility: HOSPITAL | Age: 31
Discharge: HOME | End: 2021-04-27
Attending: INTERNAL MEDICINE
Payer: MEDICAID

## 2021-04-27 VITALS
HEIGHT: 73 IN | TEMPERATURE: 98 F | BODY MASS INDEX: 30.22 KG/M2 | HEART RATE: 64 BPM | RESPIRATION RATE: 20 BRPM | SYSTOLIC BLOOD PRESSURE: 158 MMHG | OXYGEN SATURATION: 98 % | WEIGHT: 228 LBS | DIASTOLIC BLOOD PRESSURE: 96 MMHG

## 2021-04-27 DIAGNOSIS — M79.671 INTRACTABLE RIGHT HEEL PAIN: ICD-10-CM

## 2021-04-27 DIAGNOSIS — M77.31 CALCANEAL SPUR OF RIGHT FOOT: Primary | ICD-10-CM

## 2021-04-27 PROCEDURE — 25000003 PHARM REV CODE 250: Mod: ER | Performed by: INTERNAL MEDICINE

## 2021-04-27 PROCEDURE — 99283 EMERGENCY DEPT VISIT LOW MDM: CPT | Mod: 25,ER

## 2021-04-27 RX ORDER — ACETAMINOPHEN 500 MG
1000 TABLET ORAL
Status: COMPLETED | OUTPATIENT
Start: 2021-04-27 | End: 2021-04-27

## 2021-04-27 RX ADMIN — ACETAMINOPHEN 1000 MG: 500 TABLET, FILM COATED ORAL at 06:04

## 2021-05-26 ENCOUNTER — HOSPITAL ENCOUNTER (EMERGENCY)
Facility: HOSPITAL | Age: 31
Discharge: HOME | End: 2021-05-26
Attending: EMERGENCY MEDICINE
Payer: MEDICAID

## 2021-05-26 VITALS
HEIGHT: 73 IN | RESPIRATION RATE: 19 BRPM | OXYGEN SATURATION: 100 % | WEIGHT: 228 LBS | HEART RATE: 65 BPM | TEMPERATURE: 98 F | SYSTOLIC BLOOD PRESSURE: 159 MMHG | BODY MASS INDEX: 30.22 KG/M2 | DIASTOLIC BLOOD PRESSURE: 105 MMHG

## 2021-05-26 DIAGNOSIS — M79.642 BILATERAL HAND PAIN: Primary | ICD-10-CM

## 2021-05-26 DIAGNOSIS — D72.819 LEUKOPENIA, UNSPECIFIED TYPE: ICD-10-CM

## 2021-05-26 DIAGNOSIS — M79.641 BILATERAL HAND PAIN: Primary | ICD-10-CM

## 2021-05-26 LAB
ALBUMIN SERPL-MCNC: 3.7 G/DL (ref 3.3–5.5)
ALP SERPL-CCNC: 59 U/L (ref 42–141)
BILIRUB SERPL-MCNC: 0.6 MG/DL (ref 0.2–1.6)
BUN SERPL-MCNC: 13 MG/DL (ref 7–22)
CALCIUM SERPL-MCNC: 9.2 MG/DL (ref 8–10.3)
CHLORIDE SERPL-SCNC: 105 MMOL/L (ref 98–108)
CREAT SERPL-MCNC: 1 MG/DL (ref 0.6–1.2)
GLUCOSE SERPL-MCNC: 103 MG/DL (ref 73–118)
POC ALT (SGPT): 53 U/L (ref 10–47)
POC AST (SGOT): 47 U/L (ref 11–38)
POC TCO2: 35 MMOL/L (ref 18–33)
POTASSIUM BLD-SCNC: 4.6 MMOL/L (ref 3.6–5.1)
PROTEIN, POC: 6.8 G/DL (ref 6.4–8.1)
SODIUM BLD-SCNC: 139 MMOL/L (ref 128–145)

## 2021-05-26 PROCEDURE — 99283 EMERGENCY DEPT VISIT LOW MDM: CPT | Mod: ER

## 2021-05-26 PROCEDURE — 80053 COMPREHEN METABOLIC PANEL: CPT | Mod: ER

## 2021-05-26 PROCEDURE — 85025 COMPLETE CBC W/AUTO DIFF WBC: CPT | Mod: ER

## 2021-05-26 RX ORDER — FAMOTIDINE 20 MG/1
20 TABLET, FILM COATED ORAL 2 TIMES DAILY
Qty: 30 TABLET | Refills: 0 | Status: SHIPPED | OUTPATIENT
Start: 2021-05-26 | End: 2022-03-15

## 2021-05-26 RX ORDER — ACETAMINOPHEN 500 MG
1000 TABLET ORAL EVERY 8 HOURS PRN
Qty: 30 TABLET | Refills: 0 | Status: SHIPPED | OUTPATIENT
Start: 2021-05-26 | End: 2022-03-15

## 2021-05-26 RX ORDER — MELOXICAM 7.5 MG/1
7.5 TABLET ORAL DAILY PRN
Qty: 15 TABLET | Refills: 0 | Status: SHIPPED | OUTPATIENT
Start: 2021-05-26 | End: 2022-03-15

## 2022-03-15 ENCOUNTER — HOSPITAL ENCOUNTER (EMERGENCY)
Facility: HOSPITAL | Age: 32
Discharge: HOME | End: 2022-03-15
Attending: EMERGENCY MEDICINE
Payer: MEDICAID

## 2022-03-15 VITALS
DIASTOLIC BLOOD PRESSURE: 102 MMHG | WEIGHT: 240 LBS | HEART RATE: 70 BPM | SYSTOLIC BLOOD PRESSURE: 167 MMHG | TEMPERATURE: 98 F | HEIGHT: 73 IN | RESPIRATION RATE: 18 BRPM | OXYGEN SATURATION: 100 % | BODY MASS INDEX: 31.81 KG/M2

## 2022-03-15 DIAGNOSIS — S80.02XA CONTUSION OF LEFT KNEE, INITIAL ENCOUNTER: Primary | ICD-10-CM

## 2022-03-15 DIAGNOSIS — M17.10 ARTHRITIS OF KNEE: ICD-10-CM

## 2022-03-15 DIAGNOSIS — M25.569 KNEE PAIN: ICD-10-CM

## 2022-03-15 PROCEDURE — 99284 EMERGENCY DEPT VISIT MOD MDM: CPT | Mod: 25,ER

## 2022-03-15 PROCEDURE — 25000003 PHARM REV CODE 250: Mod: ER | Performed by: PHYSICIAN ASSISTANT

## 2022-03-15 RX ORDER — AMLODIPINE BESYLATE 5 MG/1
5 TABLET ORAL
Status: COMPLETED | OUTPATIENT
Start: 2022-03-15 | End: 2022-03-15

## 2022-03-15 RX ORDER — FAMOTIDINE 20 MG/1
20 TABLET, FILM COATED ORAL 2 TIMES DAILY
Qty: 30 TABLET | Refills: 0 | OUTPATIENT
Start: 2022-03-15 | End: 2022-12-09

## 2022-03-15 RX ORDER — MELOXICAM 7.5 MG/1
7.5 TABLET ORAL DAILY
Qty: 15 TABLET | Refills: 0 | Status: SHIPPED | OUTPATIENT
Start: 2022-03-15 | End: 2022-03-30

## 2022-03-15 RX ORDER — OXYCODONE AND ACETAMINOPHEN 5; 325 MG/1; MG/1
1 TABLET ORAL
Status: COMPLETED | OUTPATIENT
Start: 2022-03-15 | End: 2022-03-15

## 2022-03-15 RX ORDER — AMLODIPINE BESYLATE 5 MG/1
10 TABLET ORAL DAILY
Qty: 30 TABLET | Refills: 0 | Status: SHIPPED | OUTPATIENT
Start: 2022-03-15 | End: 2022-07-09 | Stop reason: SDUPTHER

## 2022-03-15 RX ORDER — ACETAMINOPHEN 500 MG
500 TABLET ORAL EVERY 4 HOURS PRN
Qty: 20 TABLET | Refills: 0 | Status: SHIPPED | OUTPATIENT
Start: 2022-03-15 | End: 2022-03-20

## 2022-03-15 RX ADMIN — AMLODIPINE BESYLATE 5 MG: 5 TABLET ORAL at 09:03

## 2022-03-15 RX ADMIN — OXYCODONE AND ACETAMINOPHEN 1 TABLET: 5; 325 TABLET ORAL at 09:03

## 2022-03-15 NOTE — Clinical Note
"Greg "Tiffanie Magañaman was seen and treated in our emergency department on 3/15/2022.  He may return to work on 03/18/2022.       If you have any questions or concerns, please don't hesitate to call.      Shirley Lucia PA-C"

## 2022-03-16 NOTE — DISCHARGE INSTRUCTIONS
Your x-ray did not show anything broken. It did show some mild arthritis. Take Mobic with Pepcid and Tylenol for pain.     Apply Lidoderm for pain as needed.     Referral was sent to orthopedics. Please also call the numbers above to try to get an orthopedic appointment.   Return to ER for worsening symptoms or as needed.   Thank you for coming to our Emergency Department today. It is important to remember that some problems are difficult to diagnose and may not be found during your Emergency Department visit. Be sure to follow up with your primary care doctor and review all labs/imaging/tests that were performed during this visit with them. Some labs/tests may be outside of the normal range and require non-emergent follow-up and further investigation to help diagnose/exclude/prevent complications or other medical conditions.    If you do not have a primary care doctor, you may contact the one listed on your discharge paperwork or you may also call the [facility] Appointment Desk at [phone] to schedule an appointment and establish care with one. It is important to your health that you have a primary care doctor.    Please take all medications as directed. All medications may potentially have side-effects and it is impossible to predict which medications may give you side-effects or what side-effects (if any) they will give you.. If you feel that you are having a negative effect or side-effect of any medication you should immediately stop taking them and seek medical attention. If you feel that you are having a life-threatening reaction call 911.    Return to the ER with any questions/concerns, new/concerning symptoms, worsening or failure to improve.     Do not drive, swim, climb to height, take a bath or make any important decisions for 24 hours if you have received any pain medications, sedatives or mood altering drugs during your ER visit.
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
## 2022-07-08 ENCOUNTER — HOSPITAL ENCOUNTER (EMERGENCY)
Facility: HOSPITAL | Age: 32
Discharge: LEFT BEFORE DISCHARGE | End: 2022-07-09
Payer: MEDICAID

## 2022-07-08 VITALS
BODY MASS INDEX: 31 KG/M2 | SYSTOLIC BLOOD PRESSURE: 170 MMHG | RESPIRATION RATE: 16 BRPM | DIASTOLIC BLOOD PRESSURE: 107 MMHG | WEIGHT: 235 LBS | OXYGEN SATURATION: 99 % | TEMPERATURE: 98 F | HEART RATE: 83 BPM

## 2022-07-08 PROCEDURE — 99281 EMR DPT VST MAYX REQ PHY/QHP: CPT | Mod: ER

## 2022-07-08 RX ORDER — HYDROXYZINE PAMOATE 25 MG/1
25 CAPSULE ORAL
Status: DISCONTINUED | OUTPATIENT
Start: 2022-07-08 | End: 2022-07-09 | Stop reason: HOSPADM

## 2022-07-09 ENCOUNTER — HOSPITAL ENCOUNTER (EMERGENCY)
Facility: HOSPITAL | Age: 32
Discharge: HOME | End: 2022-07-09
Attending: EMERGENCY MEDICINE
Payer: MEDICAID

## 2022-07-09 VITALS
BODY MASS INDEX: 31.14 KG/M2 | OXYGEN SATURATION: 100 % | HEIGHT: 73 IN | WEIGHT: 235 LBS | TEMPERATURE: 98 F | HEART RATE: 78 BPM | SYSTOLIC BLOOD PRESSURE: 170 MMHG | RESPIRATION RATE: 16 BRPM | DIASTOLIC BLOOD PRESSURE: 111 MMHG

## 2022-07-09 DIAGNOSIS — T30.4 CHEMICAL BURN: Primary | ICD-10-CM

## 2022-07-09 DIAGNOSIS — I10 HYPERTENSION, UNSPECIFIED TYPE: ICD-10-CM

## 2022-07-09 PROCEDURE — 99284 EMERGENCY DEPT VISIT MOD MDM: CPT

## 2022-07-09 PROCEDURE — 25000003 PHARM REV CODE 250: Performed by: PHYSICIAN ASSISTANT

## 2022-07-09 RX ORDER — BACITRACIN ZINC 500 UNIT/G
OINTMENT (GRAM) TOPICAL 2 TIMES DAILY
Qty: 14 G | Refills: 0 | Status: SHIPPED | OUTPATIENT
Start: 2022-07-09

## 2022-07-09 RX ORDER — AMLODIPINE BESYLATE 5 MG/1
10 TABLET ORAL DAILY
Qty: 30 TABLET | Refills: 0 | Status: SHIPPED | OUTPATIENT
Start: 2022-07-09 | End: 2022-12-09 | Stop reason: SDUPTHER

## 2022-07-09 RX ORDER — ACETAMINOPHEN 325 MG/1
650 TABLET ORAL
Status: COMPLETED | OUTPATIENT
Start: 2022-07-09 | End: 2022-07-09

## 2022-07-09 RX ORDER — BACITRACIN 500 [USP'U]/G
OINTMENT TOPICAL ONCE
Status: COMPLETED | OUTPATIENT
Start: 2022-07-09 | End: 2022-07-09

## 2022-07-09 RX ADMIN — ACETAMINOPHEN 650 MG: 325 TABLET ORAL at 01:07

## 2022-07-09 RX ADMIN — BACITRACIN: 500 OINTMENT TOPICAL at 01:07
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
## 2022-07-09 NOTE — DISCHARGE INSTRUCTIONS
Clean area gently with soap and water twice daily.  Dry thoroughly.  Apply topical antibiotics followed by nonstick dressing.    Low-salt diet.  Take Norvasc as prescribed.  Follow-up with PCP for re-evaluation of wound, for re-evaluation of elevated blood pressure.    Return to this ED if rash worsens despite treatment, if you begin with fever, if you begin with joint swelling, if unable to walk or bear weight, if any other problems occur.

## 2022-09-06 ENCOUNTER — TELEPHONE (OUTPATIENT)
Dept: FAMILY MEDICINE | Facility: CLINIC | Age: 32
End: 2022-09-06
Payer: MEDICAID

## 2022-09-06 NOTE — TELEPHONE ENCOUNTER
Left voicemail that patient will need to re-establish care with another PCP and that [facility] is not accepting Medicaid patients at this time. Provided [facility].

## 2022-09-06 NOTE — TELEPHONE ENCOUNTER
----- Message from Joyce Page sent at 9/6/2022 11:52 AM CDT -----  .Type:  Patient Requesting Referral    Who Called: self     Referral to What Specialty: Podiatry     Reason for Referral: Toe nail fungus     Does the patient want the referral with a specific physician?: yes     Is the specialist an [facility]?: Dr. Garcia     Would the patient rather a call back or a response via [facility]? Call back     Best Call Back Number: [phone]

## 2022-12-09 ENCOUNTER — HOSPITAL ENCOUNTER (EMERGENCY)
Facility: HOSPITAL | Age: 32
Discharge: HOME | End: 2022-12-09
Attending: EMERGENCY MEDICINE
Payer: MEDICAID

## 2022-12-09 VITALS
BODY MASS INDEX: 30.48 KG/M2 | HEART RATE: 66 BPM | SYSTOLIC BLOOD PRESSURE: 161 MMHG | RESPIRATION RATE: 18 BRPM | WEIGHT: 230 LBS | TEMPERATURE: 98 F | HEIGHT: 73 IN | DIASTOLIC BLOOD PRESSURE: 109 MMHG | OXYGEN SATURATION: 99 %

## 2022-12-09 DIAGNOSIS — I10 HTN (HYPERTENSION): ICD-10-CM

## 2022-12-09 DIAGNOSIS — R03.0 ELEVATED BLOOD PRESSURE READING: ICD-10-CM

## 2022-12-09 DIAGNOSIS — K52.9 GASTROENTERITIS: Primary | ICD-10-CM

## 2022-12-09 LAB
ALBUMIN SERPL-MCNC: 4.1 G/DL (ref 3.3–5.5)
ALP SERPL-CCNC: 59 U/L (ref 42–141)
BILIRUB SERPL-MCNC: 0.6 MG/DL (ref 0.2–1.6)
BUN SERPL-MCNC: 11 MG/DL (ref 7–22)
CALCIUM SERPL-MCNC: 10.1 MG/DL (ref 8–10.3)
CHLORIDE SERPL-SCNC: 103 MMOL/L (ref 98–108)
CREAT SERPL-MCNC: 1.1 MG/DL (ref 0.6–1.2)
GLUCOSE SERPL-MCNC: 95 MG/DL (ref 73–118)
POC ALT (SGPT): 30 U/L (ref 10–47)
POC AST (SGOT): 33 U/L (ref 11–38)
POC CARDIAC TROPONIN I: 0 NG/ML (ref 0–0.08)
POC TCO2: 33 MMOL/L (ref 18–33)
POTASSIUM BLD-SCNC: 4.4 MMOL/L (ref 3.6–5.1)
PROTEIN, POC: 7.1 G/DL (ref 6.4–8.1)
SAMPLE: NORMAL
SODIUM BLD-SCNC: 143 MMOL/L (ref 128–145)

## 2022-12-09 PROCEDURE — 93005 ELECTROCARDIOGRAM TRACING: CPT | Mod: ER

## 2022-12-09 PROCEDURE — 80053 COMPREHEN METABOLIC PANEL: CPT | Mod: ER

## 2022-12-09 PROCEDURE — 84484 ASSAY OF TROPONIN QUANT: CPT | Mod: ER

## 2022-12-09 PROCEDURE — 93010 EKG 12-LEAD: ICD-10-PCS | Mod: ,,, | Performed by: INTERNAL MEDICINE

## 2022-12-09 PROCEDURE — 99285 EMERGENCY DEPT VISIT HI MDM: CPT | Mod: 25,ER

## 2022-12-09 PROCEDURE — 93010 ELECTROCARDIOGRAM REPORT: CPT | Mod: ,,, | Performed by: INTERNAL MEDICINE

## 2022-12-09 PROCEDURE — 85025 COMPLETE CBC W/AUTO DIFF WBC: CPT | Mod: ER

## 2022-12-09 PROCEDURE — 25000003 PHARM REV CODE 250: Mod: ER | Performed by: EMERGENCY MEDICINE

## 2022-12-09 RX ORDER — ACETAMINOPHEN 500 MG
1000 TABLET ORAL EVERY 6 HOURS PRN
Qty: 30 TABLET | Refills: 0 | Status: SHIPPED | OUTPATIENT
Start: 2022-12-09

## 2022-12-09 RX ORDER — ONDANSETRON 4 MG/1
8 TABLET, ORALLY DISINTEGRATING ORAL ONCE
Status: COMPLETED | OUTPATIENT
Start: 2022-12-09 | End: 2022-12-09

## 2022-12-09 RX ORDER — FAMOTIDINE 20 MG/1
20 TABLET, FILM COATED ORAL 2 TIMES DAILY
Qty: 20 TABLET | Refills: 0 | Status: SHIPPED | OUTPATIENT
Start: 2022-12-09 | End: 2023-12-09

## 2022-12-09 RX ORDER — AMLODIPINE BESYLATE 5 MG/1
10 TABLET ORAL DAILY
Qty: 60 TABLET | Refills: 0 | Status: SHIPPED | OUTPATIENT
Start: 2022-12-09 | End: 2023-01-08

## 2022-12-09 RX ORDER — ONDANSETRON 8 MG/1
8 TABLET, ORALLY DISINTEGRATING ORAL EVERY 6 HOURS PRN
Qty: 20 TABLET | Refills: 0 | Status: SHIPPED | OUTPATIENT
Start: 2022-12-09 | End: 2022-12-11

## 2022-12-09 RX ORDER — PROMETHAZINE HYDROCHLORIDE 25 MG/1
25 SUPPOSITORY RECTAL EVERY 6 HOURS PRN
Qty: 10 SUPPOSITORY | Refills: 0 | Status: SHIPPED | OUTPATIENT
Start: 2022-12-09

## 2022-12-09 RX ADMIN — ONDANSETRON 8 MG: 4 TABLET, ORALLY DISINTEGRATING ORAL at 01:12

## 2022-12-09 NOTE — ED TRIAGE NOTES
Greg Castellanos, a 32 y.o. male presents to the ED via PV with CC of diarrhea and abdominal pain after eating [employer] last night. Pt states he has multiple episodes of vomiting since waking up this morning.

## 2022-12-09 NOTE — Clinical Note
"Deaaron "Tiffanie Magañaman was seen and treated in our emergency department on 12/9/2022.  He may return to work on 12/11/2022.       If you have any questions or concerns, please don't hesitate to call.      Luanne Polk, DO"

## 2022-12-09 NOTE — ED PROVIDER NOTES
"Encounter Date: 12/9/2022    SCRIBE #1 NOTE: I, Zulay Carvalho, am scribing for, and in the presence of,  Luanne Polk DO. I have scribed the following portions of the note - the EKG reading. Other sections scribed: HPI, ROS, PE.   SCRIBE #2 NOTE: I, Zheng Oates, am scribing for, and in the presence of,  Luanne Polk DO. I have scribed the remaining portions of the note not scribed by Scribe #1.   History     Chief Complaint   Patient presents with    Abdominal Pain     Pt presents to ed with c/o upper left abd pain and diarrhea that started this morning around 0430. States has had 5 bowel movements this am. Pt states that he thinks he ate something last night. Elevated BP noted in triage. Pt states that he does not and has not ever taking anything for his BP.     Greg Castellanos is a 32 y.o. male with Hx of peptic ulcer and HTN who presents to the ED for chief complaint of upper abdominal pain beginning 9 hours ago. Patient reports upper abdominal pain for nine hours after getting off work and eating [employer], endorsing he believes "it was bad" and that it may be the cause.  Patient further notes complaints of nausea, and diarrhea. No medications taken PTA. No other exacerbating or alleviating factors noted at this time. Denies fever, chills, chest pain, SOB, dysuria or any other discomfort at this time. Patient endorses occasional EtOH usage (wine), but denies usage of tobacco or other recreational drugs. Notes allergy to Naprosyn (stomach ulcer) and Flexeril (hallucinations).    The history is provided by the patient. No  was used.   Review of patient's allergies indicates:   Allergen Reactions    Animal derived oil      Dander      Flexeril [cyclobenzaprine]     Naproxen Other (See Comments)     "I had a bleeding stomach ulcer"    Pollen extracts      Past Medical History:   Diagnosis Date    Bronchitis     Essential (primary) hypertension     Knee contusion     Peptic ulcer     Shingles     " Shingles      Past Surgical History:   Procedure Laterality Date    CIRCUMCISION, PRIMARY      HYPOSPADIAS CORRECTION       Family History   Problem Relation Age of Onset    Cancer Maternal Grandfather     Diabetes Paternal Grandfather     No Known Problems Mother     No Known Problems Father     Lupus Maternal Aunt     Diabetes Maternal Uncle      Social History     Tobacco Use    Smoking status: Never    Smokeless tobacco: Never   Substance Use Topics    Alcohol use: Yes     Comment: occ    Drug use: No     Review of Systems   Constitutional:  Negative for chills and fever.   HENT:  Negative for rhinorrhea and sore throat.    Respiratory:  Negative for shortness of breath.    Cardiovascular:  Negative for leg swelling.   Gastrointestinal:  Positive for abdominal pain (upper), diarrhea and nausea. Negative for vomiting.   Skin:  Negative for rash.   Neurological:  Negative for numbness.   All other systems reviewed and are negative.    Physical Exam     Patient gave consent to have physical exam performed.     Initial Vitals [12/09/22 1048]   BP Pulse Resp Temp SpO2   (!) 188/122 84 17 98.1 °F (36.7 °C) 98 %      MAP       --         Physical Exam    Nursing note and vitals reviewed.  Constitutional: He appears well-developed and well-nourished.   HENT:   Head: Normocephalic and atraumatic.   Right Ear: External ear normal.   Left Ear: External ear normal.   Nose: Nose normal.   Mouth/Throat: Oropharynx is clear and moist.   Eyes: Conjunctivae and EOM are normal. Pupils are equal, round, and reactive to light.   Neck: Neck supple.   Normal range of motion.  Cardiovascular:  Normal rate, regular rhythm and normal heart sounds.     Exam reveals no gallop and no friction rub.       No murmur heard.  Pulmonary/Chest: Breath sounds normal. No respiratory distress. He has no wheezes. He has no rhonchi. He has no rales.   Abdominal: Abdomen is soft. Bowel sounds are normal. There is no abdominal tenderness. There is no  rebound and no guarding.   Musculoskeletal:         General: No tenderness or edema. Normal range of motion.      Cervical back: Normal range of motion and neck supple.     Neurological: He is alert and oriented to person, place, and time. No cranial nerve deficit.   Skin: Skin is warm and dry. Capillary refill takes less than 2 seconds. No rash noted.   Psychiatric: He has a normal mood and affect. His behavior is normal.       ED Course   Procedures  Labs Reviewed   TROPONIN ISTAT   POCT CBC   POCT CMP   POCT TROPONIN   POCT CMP     EKG Readings: (Independently Interpreted)   No STEMI. Rate of 66. Normal Sinus Rhythm. Normal [place]. Normal EKG. LVH appreciated. QTc normal at 408. No prior EKG for comparison.       Imaging Results              X-Ray Chest PA And Lateral (Final result)  Result time 12/09/22 14:08:50      Final result by Shawn Vigil MD (12/09/22 14:08:50)                   Impression:      See above      Electronically signed by: Shawn Vigil MD  Date:    12/09/2022  Time:    14:08               Narrative:    EXAMINATION:  XR CHEST PA AND LATERAL    CLINICAL HISTORY:  htn;    TECHNIQUE:  PA and lateral views of the chest were performed.    COMPARISON:  None    FINDINGS:  Heart size normal.  The lungs are clear.  No pleural effusion                                       Medications   ondansetron disintegrating tablet 8 mg (8 mg Oral Given 12/9/22 1312)     Medical Decision Making:   History:   Old Medical Records: I decided to obtain old medical records.  Independently Interpreted Test(s):   I have ordered and independently interpreted EKG Reading(s) - see prior notes  Clinical Tests:   Lab Tests: Ordered and Reviewed  Radiological Study: Ordered and Reviewed  Medical Tests: Ordered and Reviewed     Chief complaint: Abdominal pain  Differential diagnosis: Gastritis, gastroenteritis, intractable vomiting, viral illness.    Treatment in the ED: PE, ondansetron disintegrating tablet 8 mg.       1:05 PM:  Patient declined IV and IV fluid treatment.  Discussed at length with patient is elevated blood pressure and need to take medication.  Ambulatory referral placed to Internal Medicine.  Patient reports feeling better after treatment in the ER.      Discussed treatment, prescriptions, labs, and imaging results.    Discharge home with zofran 8 mg, pepcid 20 mg, phenergan 25 mg, tylenol 500 mg, POCT 24 HR ambulatory BP monitor.   Fill and take prescriptions as directed.  Return to the ED if symptoms worsen or do not resolve.   Answered questions and discussed discharge plan.    Patient feels better and is ready for discharge.  Follow up with PCP/specialist in 1 day.        Scribe Attestation:   Scribe #1: I performed the above scribed service and the documentation accurately describes the services I performed. I attest to the accuracy of the note.  Scribe #2: I performed the above scribed service and the documentation accurately describes the services I performed. I attest to the accuracy of the note.     I, Dr. Luanne Polk, personally performed the services described in this documentation. This document was produced by a scribe under my direction and in my presence. All medical record entries made by the scribe were at my direction and in my presence.  I have reviewed the chart and agree that the record reflects my personal performance and is accurate and complete. Luanne Polk DO.     12/09/2022 2:24 PM                       Clinical Impression:   Final diagnoses:  [K52.9] Gastroenteritis (Primary)  [R03.0] Elevated blood pressure reading  [I10] HTN (hypertension)        ED Disposition Condition    Discharge Stable          ED Prescriptions       Medication Sig Dispense Start Date End Date Auth. Provider    ondansetron (ZOFRAN-ODT) 8 MG TbDL Take 1 tablet (8 mg total) by mouth every 6 (six) hours as needed (As needed for nausea). 20 tablet 12/9/2022 12/11/2022 Luanne Polk DO    famotidine (PEPCID) 20 MG  tablet Take 1 tablet (20 mg total) by mouth 2 (two) times daily. 20 tablet 12/9/2022 12/9/2023 Luanne Polk DO    promethazine (PHENERGAN) 25 MG suppository Place 1 suppository (25 mg total) rectally every 6 (six) hours as needed (Use if  nausea not controlled with oral medication). 10 suppository 12/9/2022 -- Luanne Polk DO    acetaminophen (TYLENOL) 500 MG tablet Take 2 tablets (1,000 mg total) by mouth every 6 (six) hours as needed for Pain. 30 tablet 12/9/2022 -- Luanne Polk DO    amLODIPine (NORVASC) 5 MG tablet Take 2 tablets (10 mg total) by mouth once daily. To initiate treatment for hypertension start with 1 tablet by mouth daily for 1 week then increase to 2 tablets by mouth daily. 60 tablet 12/9/2022 1/8/2023 Luanne Polk DO          Follow-up Information       Follow up With Specialties Details Why Contact Info    Shahrzad Blackmon MD Internal Medicine, Wound Care Schedule an appointment as soon as possible for a visit   [address] [place] [address]  [address]  [place] [id number]  [phone]      [facility]  Schedule an appointment as soon as possible for a visit in 1 day  [address]  [place] [address]  [phone]      [facility] - Emergency Dept Emergency Medicine Go to  Please go to [facility] emergency department if symptoms worsen [address]  [facility] [id number] [phone]             Luanne Polk DO  12/09/22 2486

## 2022-12-14 NOTE — TELEPHONE ENCOUNTER
Lt message stating appts for ultrasound,labwork an follow up appt joby appt letters will be mailed out.-megan   No

## 2023-02-13 ENCOUNTER — HOSPITAL ENCOUNTER (EMERGENCY)
Facility: HOSPITAL | Age: 33
Discharge: LEFT BEFORE DISCHARGE | End: 2023-02-13
Attending: EMERGENCY MEDICINE
Payer: MEDICAID

## 2023-02-13 VITALS
RESPIRATION RATE: 20 BRPM | OXYGEN SATURATION: 99 % | BODY MASS INDEX: 29.16 KG/M2 | HEIGHT: 73 IN | DIASTOLIC BLOOD PRESSURE: 90 MMHG | HEART RATE: 84 BPM | SYSTOLIC BLOOD PRESSURE: 186 MMHG | WEIGHT: 220 LBS | TEMPERATURE: 98 F

## 2023-02-13 DIAGNOSIS — M25.512 LEFT SHOULDER PAIN: ICD-10-CM

## 2023-02-13 DIAGNOSIS — Z53.21 ELOPED FROM EMERGENCY DEPARTMENT: Primary | ICD-10-CM

## 2023-02-13 PROCEDURE — 99283 EMERGENCY DEPT VISIT LOW MDM: CPT | Mod: ER

## 2023-02-13 NOTE — ED PROVIDER NOTES
"Encounter Date: 2/13/2023       History     Chief Complaint   Patient presents with    Muscle Pain     Pt states aggravated rotator cuff again lifting heavy objects. Pt states he woke up and cannot move left arm and neck. No obvious signs of trauma pt has Hx of injuries to left rotator cuff      HPI    Patient eloped after triage and FPE    Review of patient's allergies indicates:   Allergen Reactions    Animal derived oil      Dander      Flexeril [cyclobenzaprine]     Naproxen Other (See Comments)     "I had a bleeding stomach ulcer"    Pollen extracts      Past Medical History:   Diagnosis Date    Bronchitis     Essential (primary) hypertension     Knee contusion     Peptic ulcer     Shingles     Shingles      Past Surgical History:   Procedure Laterality Date    CIRCUMCISION, PRIMARY      HYPOSPADIAS CORRECTION       Family History   Problem Relation Age of Onset    Cancer Maternal Grandfather     Diabetes Paternal Grandfather     No Known Problems Mother     No Known Problems Father     Lupus Maternal Aunt     Diabetes Maternal Uncle      Social History     Tobacco Use    Smoking status: Never    Smokeless tobacco: Never   Substance Use Topics    Alcohol use: Yes     Comment: occ    Drug use: No     Review of Systems  Patient eloped after triage and FPE  Physical Exam     Initial Vitals [02/13/23 1325]   BP Pulse Resp Temp SpO2   (!) 186/90 84 20 98 °F (36.7 °C) 99 %      MAP       --         Physical Exam  Patient eloped after triage and FPE  ED Course   Procedures  Labs Reviewed - No data to display       Imaging Results              X-Ray Shoulder Trauma Left (Final result)  Result time 02/13/23 13:58:17      Final result by Bronson Washington III, MD (02/13/23 13:58:17)                   Narrative:    EXAMINATION:  XR SHOULDER TRAUMA 3 VIEW LEFT    CLINICAL HISTORY:  Pain in left shoulder    FINDINGS:  Three views left shoulder: No fracture dislocation bone destruction seen.      Electronically signed " by: Bronson Washington MD  Date:    02/13/2023  Time:    13:58                                     Medications - No data to display                           Clinical Impression:   Final diagnoses:  [M25.512] Left shoulder pain  [Z53.21] Eloped from emergency department (Primary)        ED Disposition Condition    Eloped                 Lesley Freitas, [facility]  02/13/23 1604

## 2023-02-14 ENCOUNTER — HOSPITAL ENCOUNTER (EMERGENCY)
Facility: HOSPITAL | Age: 33
Discharge: HOME | End: 2023-02-14
Attending: EMERGENCY MEDICINE
Payer: MEDICAID

## 2023-02-14 VITALS
WEIGHT: 220 LBS | OXYGEN SATURATION: 99 % | DIASTOLIC BLOOD PRESSURE: 110 MMHG | RESPIRATION RATE: 15 BRPM | HEART RATE: 88 BPM | SYSTOLIC BLOOD PRESSURE: 187 MMHG | BODY MASS INDEX: 29.03 KG/M2 | TEMPERATURE: 98 F

## 2023-02-14 DIAGNOSIS — S46.002A INJURY OF LEFT ROTATOR CUFF, INITIAL ENCOUNTER: Primary | ICD-10-CM

## 2023-02-14 PROCEDURE — 96372 THER/PROPH/DIAG INJ SC/IM: CPT | Performed by: EMERGENCY MEDICINE

## 2023-02-14 PROCEDURE — 99284 EMERGENCY DEPT VISIT MOD MDM: CPT | Mod: ER

## 2023-02-14 PROCEDURE — 63600175 PHARM REV CODE 636 W HCPCS: Mod: ER | Performed by: EMERGENCY MEDICINE

## 2023-02-14 RX ORDER — PREDNISONE 20 MG/1
40 TABLET ORAL DAILY
Qty: 10 TABLET | Refills: 0 | Status: SHIPPED | OUTPATIENT
Start: 2023-02-14 | End: 2023-02-19

## 2023-02-14 RX ORDER — ORPHENADRINE CITRATE 30 MG/ML
60 INJECTION INTRAMUSCULAR; INTRAVENOUS
Status: COMPLETED | OUTPATIENT
Start: 2023-02-14 | End: 2023-02-14

## 2023-02-14 RX ORDER — KETOROLAC TROMETHAMINE 30 MG/ML
15 INJECTION, SOLUTION INTRAMUSCULAR; INTRAVENOUS
Status: COMPLETED | OUTPATIENT
Start: 2023-02-14 | End: 2023-02-14

## 2023-02-14 RX ORDER — METHOCARBAMOL 500 MG/1
500 TABLET, FILM COATED ORAL 2 TIMES DAILY PRN
Qty: 15 TABLET | Refills: 0 | Status: SHIPPED | OUTPATIENT
Start: 2023-02-14 | End: 2023-02-19

## 2023-02-14 RX ADMIN — ORPHENADRINE CITRATE 60 MG: 30 INJECTION INTRAMUSCULAR; INTRAVENOUS at 03:02

## 2023-02-14 RX ADMIN — KETOROLAC TROMETHAMINE 15 MG: 30 INJECTION, SOLUTION INTRAMUSCULAR; INTRAVENOUS at 03:02

## 2023-02-14 NOTE — ED PROVIDER NOTES
"Encounter Date: 2/14/2023       History     Chief Complaint   Patient presents with    Shoulder Pain     PT STATES HE HEARD A POP IN L SHOULDER WHEN CLOSING DOOR TO BIG RIG APPROX 1 WK AGO. SEEN APPROX 12H AGO, XR DONE FROM [facility] AND PT ELOPED BEFORE COMING BACK      This patient presents emergency department with what he considers to be a re-injury of his rotator cuff on the left-hand side.  Patient was seen in emergency department the left or eloped from the emergency department.  X-ray evaluation of that time was unremarkable.  Patient is specifically requesting pain control for his left shoulder.    The history is provided by the patient.   Review of patient's allergies indicates:   Allergen Reactions    Animal derived oil      Dander      Flexeril [cyclobenzaprine]     Naproxen Other (See Comments)     "I had a bleeding stomach ulcer"    Pollen extracts      Past Medical History:   Diagnosis Date    Bronchitis     Essential (primary) hypertension     Knee contusion     Peptic ulcer     Shingles     Shingles      Past Surgical History:   Procedure Laterality Date    CIRCUMCISION, PRIMARY      HYPOSPADIAS CORRECTION       Family History   Problem Relation Age of Onset    Cancer Maternal Grandfather     Diabetes Paternal Grandfather     No Known Problems Mother     No Known Problems Father     Lupus Maternal Aunt     Diabetes Maternal Uncle      Social History     Tobacco Use    Smoking status: Never    Smokeless tobacco: Never   Substance Use Topics    Alcohol use: Yes     Comment: occ    Drug use: No     Review of Systems   Constitutional: Negative.    HENT: Negative.     Eyes: Negative.    Respiratory: Negative.     Cardiovascular: Negative.    Gastrointestinal: Negative.    Endocrine: Negative.    Genitourinary: Negative.    Musculoskeletal: Negative.    Skin: Negative.    Allergic/Immunologic: Negative.    Neurological: Negative.    Hematological: Negative.    Psychiatric/Behavioral: Negative.     All other " systems reviewed and are negative.    Physical Exam     Initial Vitals [02/14/23 0309]   BP Pulse Resp Temp SpO2   (!) 187/110 88 15 97.8 °F (36.6 °C) 99 %      MAP       --         Physical Exam    Nursing note and vitals reviewed.  Constitutional: Vital signs are normal. He appears well-developed. He is active and cooperative.   HENT:   Head: Normocephalic and atraumatic.   Eyes: Conjunctivae, EOM and lids are normal. Pupils are equal, round, and reactive to light.   Neck: Trachea normal and phonation normal. Neck supple. No thyroid mass present. No stridor present.   Normal range of motion.   Full passive range of motion without pain.     Cardiovascular:  Normal rate, regular rhythm, S1 normal, S2 normal, normal heart sounds, intact distal pulses and normal pulses.           Pulmonary/Chest: Effort normal and breath sounds normal.   Abdominal: Abdomen is soft and flat. Bowel sounds are normal. There is no abdominal tenderness.   Musculoskeletal:      Right shoulder: No tenderness. Normal range of motion.      Left shoulder: Tenderness present. No bony tenderness or crepitus. Decreased range of motion. Normal strength. Normal pulse.        Arms:       Cervical back: Full passive range of motion without pain, normal range of motion and neck supple.     Lymphadenopathy:     He has no axillary adenopathy.   Neurological: He is alert and oriented to person, place, and time. He has normal strength. No cranial nerve deficit or sensory deficit. GCS eye subscore is 4. GCS verbal subscore is 5. GCS motor subscore is 6.   Skin: Skin is warm, dry and intact.   Psychiatric: He has a normal mood and affect. His speech is normal and behavior is normal. Judgment and thought content normal. Cognition and memory are normal.       ED Course   Procedures  Labs Reviewed - No data to display       Imaging Results    None          Medications   orphenadrine injection 60 mg (60 mg Intramuscular Given 2/14/23 5645)   ketorolac injection  15 mg (15 mg Intramuscular Given 2/14/23 0345)     Medical Decision Making:   ED Management:  This patient presents with left shoulder pain.  Patient has known history of a rotator cuff injury.  Patient heard a pop when he was doing range-of-motion.  On my physical examination the patient shows limited range of motion in around the shoulder girdle.  Patient was neurovascularly intact at time of discharge I did not feel based upon the patient's presenting symptomatology as well as the lack of a significant mechanism that x-ray evaluation was not necessary.                        Clinical Impression:   Final diagnoses:  [S46.002A] Injury of left rotator cuff, initial encounter (Primary)        ED Disposition Condition    Discharge Stable          ED Prescriptions       Medication Sig Dispense Start Date End Date Auth. Provider    predniSONE (DELTASONE) 20 MG tablet Take 2 tablets (40 mg total) by mouth once daily. for 5 days 10 tablet 2/14/2023 2/19/2023 Jack Mariscal MD    methocarbamoL (ROBAXIN) 500 MG Tab Take 1 tablet (500 mg total) by mouth 2 (two) times daily as needed. 15 tablet 2/14/2023 2/19/2023 Jack Mariscal MD          Follow-up Information       Follow up With Specialties Details Why Contact Info    Your PCP  Schedule an appointment as soon as possible for a visit in 1 week               Jack Mariscal MD  02/14/23 0665

## 2023-02-14 NOTE — ED TRIAGE NOTES
PT HAD XR FROM  EARLIER THIS AFTERNOON FOR L SHOULDER PAIN X1 WEEK. SHUT DOOR TO BIG RIG AND HEARD POP.  
GDM after delivery ([place])

## 2024-06-08 ENCOUNTER — HOSPITAL ENCOUNTER (EMERGENCY)
Facility: HOSPITAL | Age: 34
Discharge: HOME | End: 2024-06-08
Attending: EMERGENCY MEDICINE

## 2024-06-08 VITALS
HEART RATE: 74 BPM | HEIGHT: 73 IN | SYSTOLIC BLOOD PRESSURE: 162 MMHG | DIASTOLIC BLOOD PRESSURE: 107 MMHG | TEMPERATURE: 99 F | OXYGEN SATURATION: 98 % | WEIGHT: 275 LBS | BODY MASS INDEX: 36.45 KG/M2 | RESPIRATION RATE: 18 BRPM

## 2024-06-08 DIAGNOSIS — R14.0 ABDOMINAL BLOATING: Primary | ICD-10-CM

## 2024-06-08 DIAGNOSIS — I10 ELEVATED BLOOD PRESSURE READING WITH DIAGNOSIS OF HYPERTENSION: ICD-10-CM

## 2024-06-08 DIAGNOSIS — K59.00 CONSTIPATION, UNSPECIFIED CONSTIPATION TYPE: ICD-10-CM

## 2024-06-08 LAB
BILIRUBIN, POC UA: NEGATIVE
BLOOD, POC UA: NEGATIVE
CLARITY, POC UA: CLEAR
COLOR, POC UA: YELLOW
GLUCOSE, POC UA: NEGATIVE
KETONES, POC UA: NEGATIVE
LEUKOCYTE EST, POC UA: NEGATIVE
NITRITE, POC UA: NEGATIVE
PH UR STRIP: 5.5 [PH]
PROTEIN, POC UA: ABNORMAL
SPECIFIC GRAVITY, POC UA: >=1.03
UROBILINOGEN, POC UA: 1 E.U./DL

## 2024-06-08 PROCEDURE — 25000003 PHARM REV CODE 250: Mod: ER | Performed by: EMERGENCY MEDICINE

## 2024-06-08 PROCEDURE — 99284 EMERGENCY DEPT VISIT MOD MDM: CPT | Mod: ER

## 2024-06-08 RX ORDER — AMLODIPINE BESYLATE 5 MG/1
10 TABLET ORAL
Status: COMPLETED | OUTPATIENT
Start: 2024-06-08 | End: 2024-06-08

## 2024-06-08 RX ORDER — SYRING-NEEDL,DISP,INSUL,0.3 ML 29 G X1/2"
296 SYRINGE, EMPTY DISPOSABLE MISCELLANEOUS
Status: COMPLETED | OUTPATIENT
Start: 2024-06-08 | End: 2024-06-08

## 2024-06-08 RX ORDER — AMLODIPINE BESYLATE 10 MG/1
10 TABLET ORAL DAILY
Qty: 30 TABLET | Refills: 0 | Status: SHIPPED | OUTPATIENT
Start: 2024-06-08 | End: 2024-07-08

## 2024-06-08 RX ORDER — PANTOPRAZOLE SODIUM 20 MG/1
20 TABLET, DELAYED RELEASE ORAL DAILY
Qty: 30 TABLET | Refills: 0 | Status: SHIPPED | OUTPATIENT
Start: 2024-06-08 | End: 2024-07-08

## 2024-06-08 RX ORDER — POLYETHYLENE GLYCOL 3350 17 G/17G
17 POWDER, FOR SOLUTION ORAL DAILY
Qty: 30 EACH | Refills: 0 | Status: SHIPPED | OUTPATIENT
Start: 2024-06-08 | End: 2024-07-08

## 2024-06-08 RX ORDER — FAMOTIDINE 20 MG/1
20 TABLET, FILM COATED ORAL 2 TIMES DAILY
Qty: 28 TABLET | Refills: 0 | Status: SHIPPED | OUTPATIENT
Start: 2024-06-08 | End: 2024-06-22

## 2024-06-08 RX ADMIN — MAGNESIUM CITRATE 296 ML: 1.75 LIQUID ORAL at 03:06

## 2024-06-08 RX ADMIN — AMLODIPINE BESYLATE 10 MG: 5 TABLET ORAL at 03:06

## 2024-06-08 NOTE — ED PROVIDER NOTES
"Encounter Date: 6/8/2024       History     Chief Complaint   Patient presents with    Abdominal Pain    Cough     C/o abdominal pain with cough x 4 days     34 y.o. male with HTN, PUD and others presents to ED c/o abdominal "bloating", decreased appetite, early satiety, and intermittent constipation x 3-4 days.  He denies any attempted home treatment for constipation. He states he had a BM yesterday that was soft but states he still feels constipated and bloated. He indicates that he is passing flatus. He specifically denies abdominal pain and states he just feeling bloated. He further denies BRBPR, melena, nausea, vomiting or food intolerance. He reports seasonal allergies flare up x 1 month which consists of productive cough with yellow sputum and post nasal drip. He reports taking Claritin daily. He reports cough not improved despite this treatment. He denies fever, SOB, CP, sore throat, difficulty swallowing, unintentional weight loss, urinary symptoms or other acute complaint.    He denies prior abdominal surgery but states he has had a bleeding ulcer in the past.  He denies ETOH use or tobacco use.    The history is provided by the patient.     Review of patient's allergies indicates:   Allergen Reactions    Animal derived oil      Dander      Flexeril [cyclobenzaprine]     Naproxen Other (See Comments)     "I had a bleeding stomach ulcer"    Pollen extracts      Past Medical History:   Diagnosis Date    Bronchitis     Essential (primary) hypertension     Knee contusion     Peptic ulcer     Shingles     Shingles      Past Surgical History:   Procedure Laterality Date    CIRCUMCISION, PRIMARY      HYPOSPADIAS CORRECTION       Family History   Problem Relation Name Age of Onset    Cancer Maternal Grandfather      Diabetes Paternal Grandfather      No Known Problems Mother      No Known Problems Father      Lupus Maternal Aunt      Diabetes Maternal Uncle       Social History     Tobacco Use    Smoking status: " Never    Smokeless tobacco: Never   Substance Use Topics    Alcohol use: Yes     Comment: occ    Drug use: No     Review of Systems   Constitutional:  Negative for appetite change and fever.   HENT:  Positive for postnasal drip.    Respiratory:  Positive for cough. Negative for shortness of breath.    Cardiovascular:  Negative for chest pain.   Gastrointestinal:  Positive for abdominal distention and constipation. Negative for abdominal pain.   Genitourinary:  Negative for decreased urine volume, difficulty urinating, dysuria, flank pain, frequency and hematuria.   Musculoskeletal:  Negative for back pain and gait problem.   Neurological:  Negative for syncope and weakness.       Physical Exam     Initial Vitals [06/08/24 0315]   BP Pulse Resp Temp SpO2   (!) 184/116 75 18 98.6 °F (37 °C) 98 %      MAP       --         Physical Exam    Nursing note and vitals reviewed.  Constitutional: He appears well-developed and well-nourished. He is not diaphoretic. No distress.   HENT:   Head: Normocephalic and atraumatic.   Mouth/Throat: Oropharynx is clear and moist.   Eyes: Conjunctivae are normal.   Neck: Phonation normal. No stridor present.   Normal range of motion.  Cardiovascular:  Regular rhythm and intact distal pulses.           Pulmonary/Chest: Effort normal. No accessory muscle usage or stridor. No tachypnea. No respiratory distress.   Abdominal: Abdomen is soft and protuberant. Bowel sounds are normal. He exhibits distension. There is no abdominal tenderness. There is no rebound and no guarding.   Musculoskeletal:         General: No tenderness. Normal range of motion.      Cervical back: Normal range of motion.     Neurological: He is alert and oriented to person, place, and time. He has normal strength. Gait normal. GCS eye subscore is 4. GCS verbal subscore is 5. GCS motor subscore is 6.   Skin: Skin is warm and intact.   Psychiatric: He has a normal mood and affect.         ED Course   Procedures  Labs  Reviewed   POCT URINALYSIS W/O SCOPE - Abnormal; Notable for the following components:       Result Value    Spec Grav UA >=1.030 (*)     Protein, UA Trace (*)     All other components within normal limits          Imaging Results    None          Medications   magnesium citrate solution 296 mL (296 mLs Oral Given 6/8/24 0357)   amLODIPine tablet 10 mg (10 mg Oral Given 6/8/24 0357)     Medical Decision Making  Amount and/or Complexity of Data Reviewed  Labs: ordered.    Risk  OTC drugs.  Prescription drug management.                        Labs Reviewed    Admission on 06/08/2024, Discharged on 06/08/2024   Component Date Value Ref Range Status    Glucose, UA 06/08/2024 Negative   Final    Bilirubin, UA 06/08/2024 Negative   Final    Ketones, UA 06/08/2024 Negative   Final    Spec Grav UA 06/08/2024 >=1.030 (>)   Final    Blood, UA 06/08/2024 Negative   Final    PH, UA 06/08/2024 5.5   Final    Protein, UA 06/08/2024 Trace (A)   Final    Urobilinogen, UA 06/08/2024 1.0  E.U./dL Final    Nitrite, UA 06/08/2024 Negative   Final    Leukocytes, UA 06/08/2024 Negative   Final    Color, UA 06/08/2024 Yellow   Final    Clarity, UA 06/08/2024 Clear   Final        Imaging Reviewed    Imaging Results    None         Medications given in ED    Medications   magnesium citrate solution 296 mL (296 mLs Oral Given 6/8/24 0357)   amLODIPine tablet 10 mg (10 mg Oral Given 6/8/24 0357)       Note was created using voice recognition software. Note may have occasional typographical errors that may not have been identified and edited despite good edis initial review prior to signing.    Medical Decision Making:   Differential Diagnosis:   Diverticulitis, ureterolithiasis, pyelonephritis, acute uncomplicated cystitis, peritonitis, bowel obstruction, colitis, fecal impaction, GIB, incarcerated/strangulated inguinal hernia, malignancy, testicular torsion, musculoskeletal pain, shingles, others   ED Management:  Patient presents to the  "emergency department complaining of abdominal bloating with incidental high blood pressure. Patient is otherwise asymptomatic without confusion, chest pain, dysuria, vision changes, focal neurological deficit or SOB. Patient is hypertensive here. Patient has not been taking HTN medication. Doubt hypertenstive emergency, patient with no signs of AMS, pulmonary edema, heart failure, ACS, PRESS syndrome, intracranial hemorrhage, renal infarction or failure or other end organ damage. Outpatient management plan, outpatient PCP follow up and ED return precautions discussed with patient with understanding and agreement.              Vitals:    06/08/24 0315 06/08/24 0400 06/08/24 0435 06/08/24 0501   BP: (!) 184/116 (!) 177/117 (!) 193/122 (!) 162/107   BP Location: Right arm      Patient Position: Sitting      Pulse: 75   74   Resp: 18      Temp: 98.6 °F (37 °C)      TempSrc: Oral      SpO2: 98%      Weight: 124.7 kg (275 lb)      Height: 6' 1" (1.854 m)          Vitals:    06/08/24 0315 06/08/24 0400 06/08/24 0435 06/08/24 0501   BP: (!) 184/116 (!) 177/117 (!) 193/122 (!) 162/107   BP Location: Right arm      Patient Position: Sitting      Pulse: 75   74   Resp: 18      Temp: 98.6 °F (37 °C)      TempSrc: Oral      SpO2: 98%      Weight: 124.7 kg (275 lb)      Height: 6' 1" (1.854 m)            Clinical Impression:  Final diagnoses:  [R14.0] Abdominal bloating (Primary)  [I10] Elevated blood pressure reading with diagnosis of hypertension  [K59.00] Constipation, unspecified constipation type          ED Disposition Condition    Discharge Stable          ED Prescriptions       Medication Sig Dispense Start Date End Date Auth. Provider    pantoprazole (PROTONIX) 20 MG tablet Take 1 tablet (20 mg total) by mouth once daily. 30 tablet 6/8/2024 7/8/2024 Romel Mcnamara MD    famotidine (PEPCID) 20 MG tablet Take 1 tablet (20 mg total) by mouth 2 (two) times daily. for 14 days 28 tablet 6/8/2024 6/22/2024 Romel Mcnamara MD "    polyethylene glycol (GLYCOLAX) 17 gram PwPk Take 17 g by mouth once daily. Take daily to prevent constipation 30 each 6/8/2024 7/8/2024 Romel Mcnamara MD    amLODIPine (NORVASC) 10 MG tablet Take 1 tablet (10 mg total) by mouth once daily. 30 tablet 6/8/2024 7/8/2024 Romel Mcnamara MD          Follow-up Information       Follow up With Specialties Details Why Contact Info    The nearest emergency department.  Go to  As needed, If symptoms worsen     Your PCP  Call  Monday morning, to schedule an appointment, for re-evaluation of today's complaint, and ongoing care              Romel Mcnamara MD  06/14/24 4439

## 2024-06-08 NOTE — ED TRIAGE NOTES
"Greg Castellanos, a 34 y.o. male presents to the ED w/ complaint of feeling constipated and bloated for a few days and also c/o allergies acting up beginning this month.  He denies any nausea, vomiting and diarrhea.  Reports cough but denies sore throat nasal drainage but reports coughing up occasional mucus.    Triage note:  Chief Complaint   Patient presents with    Abdominal Pain    Cough     C/o abdominal pain with cough x 4 days     Review of patient's allergies indicates:   Allergen Reactions    Animal derived oil      Dander      Flexeril [cyclobenzaprine]     Naproxen Other (See Comments)     "I had a bleeding stomach ulcer"    Pollen extracts      Past Medical History:   Diagnosis Date    Bronchitis     Essential (primary) hypertension     Knee contusion     Peptic ulcer     Shingles     Shingles       "

## 2024-06-26 ENCOUNTER — HOSPITAL ENCOUNTER (EMERGENCY)
Facility: HOSPITAL | Age: 34
Discharge: HOME | End: 2024-06-26
Attending: EMERGENCY MEDICINE

## 2024-06-26 VITALS
SYSTOLIC BLOOD PRESSURE: 170 MMHG | HEART RATE: 72 BPM | OXYGEN SATURATION: 100 % | WEIGHT: 245 LBS | RESPIRATION RATE: 18 BRPM | TEMPERATURE: 99 F | DIASTOLIC BLOOD PRESSURE: 98 MMHG | BODY MASS INDEX: 32.32 KG/M2

## 2024-06-26 DIAGNOSIS — J02.9 VIRAL PHARYNGITIS: Primary | ICD-10-CM

## 2024-06-26 DIAGNOSIS — J30.9 ALLERGIC RHINITIS, UNSPECIFIED SEASONALITY, UNSPECIFIED TRIGGER: ICD-10-CM

## 2024-06-26 LAB — POC RAPID STREP A: NEGATIVE

## 2024-06-26 PROCEDURE — 99283 EMERGENCY DEPT VISIT LOW MDM: CPT | Mod: ER

## 2024-06-26 PROCEDURE — 87880 STREP A ASSAY W/OPTIC: CPT | Mod: ER

## 2024-06-26 PROCEDURE — 87070 CULTURE OTHR SPECIMN AEROBIC: CPT | Performed by: NURSE PRACTITIONER

## 2024-06-26 PROCEDURE — 63600175 PHARM REV CODE 636 W HCPCS: Mod: ER | Performed by: NURSE PRACTITIONER

## 2024-06-26 RX ORDER — PREDNISONE 20 MG/1
60 TABLET ORAL
Status: COMPLETED | OUTPATIENT
Start: 2024-06-26 | End: 2024-06-26

## 2024-06-26 RX ADMIN — PREDNISONE 60 MG: 20 TABLET ORAL at 03:06

## 2024-06-26 NOTE — DISCHARGE INSTRUCTIONS
Please see your primary care for evaluation of your blood pressure. You can take zyrtec or xyzal to help with symptoms. You can also use Pataday eye drops to help with eye itching, swelling, and tearing.

## 2024-06-26 NOTE — ED PROVIDER NOTES
"Encounter Date: 6/26/2024       History     Chief Complaint   Patient presents with    Facial Swelling     Pt reports left sided eye swelling and facial swelling x 3 days        35 y/o male which presents to the ED with concerns of left eye swelling, tearing, and itching since this morning. He began to have a sore throat 2 days ago. Denies fevers or any other symptoms.     The history is provided by the patient.     Review of patient's allergies indicates:   Allergen Reactions    Animal derived oil      Dander      Flexeril [cyclobenzaprine]     Naproxen Other (See Comments)     "I had a bleeding stomach ulcer"    Pollen extracts      Past Medical History:   Diagnosis Date    Bronchitis     Essential (primary) hypertension     Knee contusion     Peptic ulcer     Shingles     Shingles      Past Surgical History:   Procedure Laterality Date    CIRCUMCISION, PRIMARY      HYPOSPADIAS CORRECTION       Family History   Problem Relation Name Age of Onset    Cancer Maternal Grandfather      Diabetes Paternal Grandfather      No Known Problems Mother      No Known Problems Father      Lupus Maternal Aunt      Diabetes Maternal Uncle       Social History     Tobacco Use    Smoking status: Never    Smokeless tobacco: Never   Substance Use Topics    Alcohol use: Yes     Comment: occ    Drug use: No     Review of Systems   Constitutional:  Negative for fever.   HENT:  Positive for sore throat.    Eyes:  Positive for discharge (clear tearing) and itching. Negative for photophobia, pain, redness and visual disturbance.   Respiratory:  Negative for shortness of breath.    Cardiovascular:  Negative for chest pain.   Gastrointestinal:  Negative for nausea.   Genitourinary:  Negative for dysuria.   Musculoskeletal:  Negative for back pain.   Skin:  Negative for rash.   Neurological:  Negative for weakness.   Hematological:  Does not bruise/bleed easily.   All other systems reviewed and are negative.      Physical Exam     Initial " Vitals [06/26/24 1432]   BP Pulse Resp Temp SpO2   (!) 173/100 78 20 99.2 °F (37.3 °C) 99 %      MAP       --         Physical Exam    Nursing note and vitals reviewed.  Constitutional: He appears well-developed and well-nourished.   HENT:   Head: Normocephalic and atraumatic.   Swollen turbinates noted to bilateral nares and cobblestone appearance to posterior oropharynx; tonsillar swelling and exudate noted with minimal erythema- no evidence of tonsillar abscess; uvula midline   Eyes: Conjunctivae and EOM are normal. Pupils are equal, round, and reactive to light.   Neck:   Tonsillar adenopathy   Normal range of motion.  Cardiovascular:  Normal rate, regular rhythm, normal heart sounds and intact distal pulses.     Exam reveals no gallop and no friction rub.       No murmur heard.  Pulmonary/Chest: Breath sounds normal. No respiratory distress. He has no wheezes. He has no rhonchi. He has no rales. He exhibits no tenderness.   Musculoskeletal:         General: No tenderness or edema. Normal range of motion.      Cervical back: Normal range of motion.     Neurological: He is alert and oriented to person, place, and time. He has normal strength. GCS score is 15. GCS eye subscore is 4. GCS verbal subscore is 5. GCS motor subscore is 6.   Skin: Skin is warm. Capillary refill takes less than 2 seconds.       ED Course   Procedures  Labs Reviewed   CULTURE, RESPIRATORY  - THROAT   POCT STREP A MOLECULAR   POCT STREP A, RAPID          Imaging Results    None          Medications   predniSONE tablet 60 mg (60 mg Oral Given 6/26/24 1524)     Medical Decision Making  33 y/o male which presents with viral uri and allergic conjunctivitis symptoms. Strep negative. Throat culture sent and is pending. Pt was given prednisone in the ED and advised to take OTC meds for symptoms. Pt's blood pressure was elevated and he states he is compliant with his blood pressure medication. He has been advised to follow up with his PCP for  better control of his blood pressure. Patient given strict return precautions and voiced understanding of all discharge instructions. Pt was stable at discharge.       Differential Diagnosis: allergic conjunctivitis, strep pharyngitis, viral pharyngitis    Problems Addressed:  Allergic rhinitis, unspecified seasonality, unspecified trigger: acute illness or injury  Viral pharyngitis: acute illness or injury    Amount and/or Complexity of Data Reviewed  Labs: ordered. Decision-making details documented in ED Course.    Risk  OTC drugs.  Prescription drug management.               ED Course as of 06/26/24 1825 Wed Jun 26, 2024   1435 BP(!): 173/100 [AT]   1435 Temp: 99.2 °F (37.3 °C) [AT]   1435 Temp Source: Oral [AT]   1435 Pulse: 78 [AT]   1435 Resp: 20 [AT]   1435 SpO2: 99 % [AT]   1551 POC Rapid Strep A: negative [AT]      ED Course User Index  [AT] Oralia Walters FNP                           Clinical Impression:  Final diagnoses:  [J02.9] Viral pharyngitis (Primary)  [J30.9] Allergic rhinitis, unspecified seasonality, unspecified trigger          ED Disposition Condition    Discharge Stable          ED Prescriptions    None       Follow-up Information       Follow up With Specialties Details Why Contact Info    primary care provider as needed                 Oralia Walters FNP  06/26/24 1825

## 2024-06-28 LAB — BACTERIA THROAT CULT: NORMAL

## 2025-03-08 ENCOUNTER — HOSPITAL ENCOUNTER (EMERGENCY)
Facility: HOSPITAL | Age: 35
Discharge: HOME | End: 2025-03-08
Attending: STUDENT IN AN ORGANIZED HEALTH CARE EDUCATION/TRAINING PROGRAM

## 2025-03-08 VITALS
RESPIRATION RATE: 20 BRPM | BODY MASS INDEX: 36.45 KG/M2 | WEIGHT: 275 LBS | SYSTOLIC BLOOD PRESSURE: 191 MMHG | HEIGHT: 73 IN | OXYGEN SATURATION: 98 % | HEART RATE: 79 BPM | DIASTOLIC BLOOD PRESSURE: 123 MMHG | TEMPERATURE: 98 F

## 2025-03-08 DIAGNOSIS — I16.0 HYPERTENSIVE URGENCY: ICD-10-CM

## 2025-03-08 DIAGNOSIS — B34.9 VIRAL SYNDROME: Primary | ICD-10-CM

## 2025-03-08 LAB
ALBUMIN SERPL-MCNC: 3.8 G/DL (ref 3.3–5.5)
ALP SERPL-CCNC: 46 U/L (ref 42–141)
BILIRUB SERPL-MCNC: 0.7 MG/DL (ref 0.2–1.6)
BILIRUBIN, POC UA: NEGATIVE
BLOOD, POC UA: NEGATIVE
BUN SERPL-MCNC: 12 MG/DL (ref 7–22)
CALCIUM SERPL-MCNC: 10.1 MG/DL (ref 8–10.3)
CHLORIDE SERPL-SCNC: 108 MMOL/L (ref 98–108)
CLARITY, UA: CLEAR
COLOR, UA: YELLOW
CREAT SERPL-MCNC: 1.1 MG/DL (ref 0.6–1.2)
CTP QC/QA: YES
GLUCOSE SERPL-MCNC: 94 MG/DL (ref 73–118)
GLUCOSE, POC UA: NEGATIVE
HCT, POC: NORMAL
HGB, POC: NORMAL (ref 14–18)
INFLUENZA A ANTIGEN, POC: NEGATIVE
INFLUENZA B ANTIGEN, POC: NEGATIVE
KETONES, POC UA: NEGATIVE
LEUKOCYTE EST, POC UA: NEGATIVE
MCH, POC: NORMAL
MCHC, POC: NORMAL
MCV, POC: NORMAL
MPV, POC: NORMAL
NITRITE, POC UA: NEGATIVE
PH UR STRIP: 7 [PH] (ref 5–8)
POC ALT (SGPT): 37 U/L (ref 10–47)
POC AST (SGOT): 34 U/L (ref 11–38)
POC PLATELET COUNT: NORMAL
POC RAPID STREP A: NEGATIVE
POC TCO2: 31 MMOL/L (ref 18–33)
POTASSIUM BLD-SCNC: 5.1 MMOL/L (ref 3.6–5.1)
PROTEIN, POC UA: NEGATIVE
PROTEIN, POC: 6.8 G/DL (ref 6.4–8.1)
RBC, POC: NORMAL
RDW, POC: NORMAL
SARS-COV-2 RDRP RESP QL NAA+PROBE: NEGATIVE
SODIUM BLD-SCNC: 138 MMOL/L (ref 128–145)
SPECIFIC GRAVITY, POC UA: 1.02 (ref 1–1.03)
UROBILINOGEN, POC UA: 1 E.U./DL
WBC, POC: NORMAL

## 2025-03-08 PROCEDURE — 87804 INFLUENZA ASSAY W/OPTIC: CPT | Mod: ER

## 2025-03-08 PROCEDURE — 85025 COMPLETE CBC W/AUTO DIFF WBC: CPT | Mod: ER

## 2025-03-08 PROCEDURE — 99284 EMERGENCY DEPT VISIT MOD MDM: CPT | Mod: 25,ER

## 2025-03-08 PROCEDURE — 93010 ELECTROCARDIOGRAM REPORT: CPT | Mod: ,,, | Performed by: INTERNAL MEDICINE

## 2025-03-08 PROCEDURE — 87880 STREP A ASSAY W/OPTIC: CPT | Mod: ER

## 2025-03-08 PROCEDURE — 93005 ELECTROCARDIOGRAM TRACING: CPT | Mod: ER

## 2025-03-08 PROCEDURE — 87635 SARS-COV-2 COVID-19 AMP PRB: CPT | Mod: ER

## 2025-03-08 PROCEDURE — 80053 COMPREHEN METABOLIC PANEL: CPT | Mod: ER

## 2025-03-08 RX ORDER — PHENOL 1.4 %
AEROSOL, SPRAY (ML) MUCOUS MEMBRANE
Qty: 177 ML | Refills: 0 | Status: SHIPPED | OUTPATIENT
Start: 2025-03-08

## 2025-03-08 RX ORDER — ACETAMINOPHEN 500 MG
1000 TABLET ORAL
Qty: 30 TABLET | Refills: 0 | Status: SHIPPED | OUTPATIENT
Start: 2025-03-08

## 2025-03-08 RX ORDER — BENZONATATE 100 MG/1
100 CAPSULE ORAL 3 TIMES DAILY PRN
Qty: 20 CAPSULE | Refills: 0 | Status: SHIPPED | OUTPATIENT
Start: 2025-03-08 | End: 2025-03-18

## 2025-03-08 RX ORDER — FLUTICASONE PROPIONATE 50 MCG
1 SPRAY, SUSPENSION (ML) NASAL 2 TIMES DAILY PRN
Qty: 15 G | Refills: 0 | Status: SHIPPED | OUTPATIENT
Start: 2025-03-08

## 2025-03-08 RX ORDER — GUAIFENESIN 100 MG/5ML
200 LIQUID ORAL EVERY 4 HOURS PRN
Qty: 118 ML | Refills: 0 | Status: SHIPPED | OUTPATIENT
Start: 2025-03-08 | End: 2025-03-18

## 2025-03-08 RX ORDER — CETIRIZINE HYDROCHLORIDE 10 MG/1
10 TABLET ORAL DAILY
Qty: 30 TABLET | Refills: 0 | Status: SHIPPED | OUTPATIENT
Start: 2025-03-08 | End: 2026-03-08

## 2025-03-08 NOTE — Clinical Note
"Yulin "Greg" Jasmin was seen and treated in our emergency department on 3/8/2025.  He may return to work on 03/10/2025.       If you have any questions or concerns, please don't hesitate to call.      Stanley Ferrell PA-C"

## 2025-03-08 NOTE — Clinical Note
"Greg "Tiffanie Magañaman was seen and treated in our emergency department on 3/8/2025.  He may return to work on 03/13/2025.       If you have any questions or concerns, please don't hesitate to call.      Monalisa Renteria RN    "

## 2025-03-09 LAB
OHS QRS DURATION: 84 MS
OHS QTC CALCULATION: 423 MS

## 2025-03-09 NOTE — DISCHARGE INSTRUCTIONS

## 2025-03-09 NOTE — ED PROVIDER NOTES
"Encounter Date: 3/8/2025       History     Chief Complaint   Patient presents with    Fatigue     Pt reports generalized body ache, sore throat x 2 days.      35-year-old male with no past medical history presents to ED for emergent evaluation of 2 day history of generalized myalgias, sore throat, rhinorrhea, nasal congestion, nonproductive cough, fatigue.  Patient reports flu contacts at work.  He denies any attempt to treatment.  He denies any fever, chills, dysphagia, chest pain, shortness of breath, abdominal pain, nausea, vomiting, diarrhea, dysuria, hematuria, dizziness, lightheadedness, headache.  No other symptoms reported.    The history is provided by the patient. No  was used.     Review of patient's allergies indicates:   Allergen Reactions    Animal derived oil      Dander      Flexeril [cyclobenzaprine]     Naproxen Other (See Comments)     "I had a bleeding stomach ulcer"    Pollen extracts      Past Medical History:   Diagnosis Date    Bronchitis     Essential (primary) hypertension     Knee contusion     Peptic ulcer     Shingles     Shingles      Past Surgical History:   Procedure Laterality Date    CIRCUMCISION, PRIMARY      HYPOSPADIAS CORRECTION       Family History   Problem Relation Name Age of Onset    Cancer Maternal Grandfather      Diabetes Paternal Grandfather      No Known Problems Mother      No Known Problems Father      Lupus Maternal Aunt      Diabetes Maternal Uncle       Social History[1]  Review of Systems   Constitutional:  Positive for fatigue. Negative for chills and fever.   HENT:  Positive for congestion, rhinorrhea and sore throat. Negative for ear pain.    Eyes:  Negative for redness.   Respiratory:  Positive for cough. Negative for shortness of breath.         (-) hemoptysis   Cardiovascular:  Negative for chest pain, palpitations and leg swelling.   Gastrointestinal:  Negative for abdominal pain, diarrhea, nausea and vomiting.   Genitourinary:  " Negative for decreased urine volume, difficulty urinating, dysuria, frequency, hematuria and urgency.   Musculoskeletal:  Positive for myalgias. Negative for back pain and neck pain.   Skin:  Negative for rash.   Neurological:  Negative for dizziness, tremors, seizures, syncope, facial asymmetry, speech difficulty, weakness, light-headedness, numbness and headaches.   Psychiatric/Behavioral:  Negative for confusion.        Physical Exam     Initial Vitals [03/08/25 2019]   BP Pulse Resp Temp SpO2   (!) 207/116 96 20 98.2 °F (36.8 °C) 99 %      MAP       --         Physical Exam    Nursing note and vitals reviewed.  Constitutional: He appears well-developed and well-nourished. He is not diaphoretic.  Non-toxic appearance. No distress.   HENT:   Head: Normocephalic and atraumatic.   Right Ear: Hearing, tympanic membrane, external ear and ear canal normal. Tympanic membrane is not perforated, not erythematous and not bulging.   Left Ear: Hearing, tympanic membrane, external ear and ear canal normal. Tympanic membrane is not perforated, not erythematous and not bulging.   Nose: Nose normal. Mouth/Throat: Uvula is midline and oropharynx is clear and moist.   Eyes: Conjunctivae and EOM are normal.   Neck: Neck supple.   Normal range of motion.   Full passive range of motion without pain.     Cardiovascular:            Pulses:       Radial pulses are 2+ on the right side and 2+ on the left side.   Pulmonary/Chest: Effort normal and breath sounds normal. No respiratory distress. He has no decreased breath sounds.   Abdominal: Abdomen is soft and flat. There is no abdominal tenderness.   No right CVA tenderness.  No left CVA tenderness. There is no rebound and no guarding.   Musculoskeletal:      Cervical back: Full passive range of motion without pain, normal range of motion and neck supple. No rigidity.     Neurological: He is alert. No cranial nerve deficit.   Neuro intact.  Strength and sensation intact to bilateral  upper and lower extremities.   Skin: Skin is warm and dry. No rash noted.         ED Course   Procedures  Labs Reviewed   SARS-COV-2 RDRP GENE       Result Value    POC Rapid COVID Negative       Acceptable Yes      Narrative:     This test utilizes isothermal nucleic acid amplification technology to detect the SARS-CoV-2 RdRp nucleic acid segment. The analytical sensitivity (limit of detection) is 500 copies/swab.     A POSITIVE result is indicative of the presence of SARS-CoV-2 RNA; clinical correlation with patient history and other diagnostic information is necessary to determine patient infection status.    A NEGATIVE result means that SARS-CoV-2 nucleic acids are not present above the limit of detection. A NEGATIVE result should be treated as presumptive. It does not rule out the possibility of COVID-19 and should not be the sole basis for treatment decisions. If COVID-19 is strongly suspected based on clinical and exposure history, re-testing using an alternate molecular assay should be considered.     Commercial kits are provided by [employer].        POCT CBC    Hematocrit        Hemoglobin        RBC        WBC        MCV        MCH, POC        MCHC        RDW-CV        Platelet Count, POC        MPV       POCT INFLUENZA A/B MOLECULAR   POCT STREP A MOLECULAR   POCT CMP   POCT URINALYSIS W/O SCOPE   POCT STREP A, RAPID    POC Rapid Strep A negative     POCT RAPID INFLUENZA A/B    Influenza B Ag negative      Inflenza A Ag negative     POCT URINALYSIS W/O SCOPE    Glucose, UA Negative      Bilirubin, UA Negative      Ketones, UA Negative      Spec Grav UA 1.020      Blood, UA Negative      PH, UA 7.0      Protein, UA Negative      Urobilinogen, UA 1.0      Nitrite, UA Negative      Leukocytes, UA Negative      Color, UA POC Yellow      Clarity, UA, POC Clear     POCT CMP    Albumin, POC 3.8      Alkaline Phosphatase, POC 46      ALT (SGPT), POC 37      AST (SGOT), POC 34      POC BUN  12      Calcium, POC 10.1      POC Chloride 108      POC Creatinine 1.1      POC Glucose 94      POC Potassium 5.1      POC Sodium 138      Bilirubin, POC 0.7      POC TCO2 31      Protein, POC 6.8            Imaging Results    None          Medications - No data to display  Medical Decision Making  This is a 35-year-old male with no past medical history presents to ED for emergent evaluation of 2 day history of generalized myalgias, sore throat, rhinorrhea, nasal congestion, nonproductive cough, fatigue.  Patient reports flu contacts at work.  He denies any attempt to treatment.  He denies any fever, chills, dysphagia, chest pain, shortness of breath, abdominal pain, nausea, vomiting, diarrhea, dysuria, hematuria, dizziness, lightheadedness, headache.  No other symptoms reported.    On physical exam, patient is well-appearing and in no acute distress.  Nontoxic appearing.  Lungs are clear to auscultation bilaterally.  Abdomen is soft and nontender.  No guarding, rigidity, rebound.  2+ radial pulses bilaterally.  Posterior oropharynx is not erythematous.  No edema or exudate.  Uvula midline.  Bilateral tympanic membrane is normal.  No erythema, bulging, or perforations.  Neuro intact.  Strength and sensation intact bilateral upper and lower extremities.  Full range of motion of neck.  No neck rigidity.  Flu, COVID, and strep negative.  CBC without evidence of any leukocytosis or anemia.  UA unremarkable.  CMP unremarkable.  Normal renal function.  No electrolyte abnormality.  I doubt hypertensive emergency at this time.  EKG revealed normal sinus rhythm with a rate of 90.  No STEMI.  Pr interval 138.  .  Left ventricular hypertrophy with repolarization abnormality.  EKG signed by Dr. Armenta.  Patient is not having any chest pain, shortness of breath, dizziness, lightheadedness, headache.  I believe his symptoms are viral in nature.  Advised patient to follow up with his primary care provider to evaluate for  hypertension.  We will discharge patient on Tylenol, Chloraseptic throat spray, Cepacol lozenges, Zyrtec, Flonase, Robitussin, Tessalon Perles.  Urged prompt follow up with PCP for further evaluation.    Strict return precautions given. I discussed with the patient/family the diagnosis, treatment plan, indications for return to the emergency department, and for expected follow-up. The patient/family verbalized an understanding. The patient/family is asked if there are any questions or concerns. We discuss the case, until all issues are addressed to the patient/family's satisfaction. Patient/family understands and is agreeable to the plan. Patient is stable and ready for discharge.      Amount and/or Complexity of Data Reviewed  Labs: ordered.                                      Clinical Impression:  Final diagnoses:  [I16.0] Hypertensive urgency  [B34.9] Viral syndrome (Primary)                     [1]   Social History  Tobacco Use    Smoking status: Never    Smokeless tobacco: Never   Substance Use Topics    Alcohol use: Yes     Comment: occ    Drug use: No        Stanley Ferrell PA-C  03/08/25 1457

## 2025-06-28 ENCOUNTER — HOSPITAL ENCOUNTER (EMERGENCY)
Facility: HOSPITAL | Age: 35
Discharge: HOME | End: 2025-06-28
Attending: STUDENT IN AN ORGANIZED HEALTH CARE EDUCATION/TRAINING PROGRAM
Payer: COMMERCIAL

## 2025-06-28 VITALS
WEIGHT: 275 LBS | HEART RATE: 73 BPM | SYSTOLIC BLOOD PRESSURE: 177 MMHG | OXYGEN SATURATION: 98 % | DIASTOLIC BLOOD PRESSURE: 115 MMHG | BODY MASS INDEX: 37.25 KG/M2 | RESPIRATION RATE: 16 BRPM | TEMPERATURE: 98 F | HEIGHT: 72 IN

## 2025-06-28 DIAGNOSIS — M25.531 RIGHT WRIST PAIN: ICD-10-CM
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
## 2025-06-28 DIAGNOSIS — M54.6 ACUTE LEFT-SIDED THORACIC BACK PAIN: Primary | ICD-10-CM

## 2025-06-28 PROCEDURE — 63600175 PHARM REV CODE 636 W HCPCS: Mod: ER | Performed by: STUDENT IN AN ORGANIZED HEALTH CARE EDUCATION/TRAINING PROGRAM

## 2025-06-28 PROCEDURE — 99284 EMERGENCY DEPT VISIT MOD MDM: CPT | Mod: 25,ER

## 2025-06-28 PROCEDURE — 96372 THER/PROPH/DIAG INJ SC/IM: CPT | Performed by: STUDENT IN AN ORGANIZED HEALTH CARE EDUCATION/TRAINING PROGRAM

## 2025-06-28 RX ORDER — ORPHENADRINE CITRATE 30 MG/ML
60 INJECTION INTRAMUSCULAR; INTRAVENOUS
Status: COMPLETED | OUTPATIENT
Start: 2025-06-28 | End: 2025-06-28

## 2025-06-28 RX ORDER — METHOCARBAMOL 500 MG/1
1000 TABLET, FILM COATED ORAL EVERY 8 HOURS PRN
Qty: 30 TABLET | Refills: 0 | Status: SHIPPED | OUTPATIENT
Start: 2025-06-28 | End: 2025-07-03

## 2025-06-28 RX ORDER — ACETAMINOPHEN 500 MG
1000 TABLET ORAL EVERY 6 HOURS PRN
Qty: 30 TABLET | Refills: 0 | Status: SHIPPED | OUTPATIENT
Start: 2025-06-28

## 2025-06-28 RX ORDER — KETOROLAC TROMETHAMINE 30 MG/ML
30 INJECTION, SOLUTION INTRAMUSCULAR; INTRAVENOUS
Status: COMPLETED | OUTPATIENT
Start: 2025-06-28 | End: 2025-06-28

## 2025-06-28 RX ORDER — LIDOCAINE 50 MG/G
1 PATCH TOPICAL DAILY
Qty: 30 PATCH | Refills: 0 | Status: SHIPPED | OUTPATIENT
Start: 2025-06-28

## 2025-06-28 RX ADMIN — ORPHENADRINE CITRATE 60 MG: 30 INJECTION, SOLUTION INTRAMUSCULAR; INTRAVENOUS at 02:06

## 2025-06-28 RX ADMIN — KETOROLAC TROMETHAMINE 30 MG: 30 INJECTION, SOLUTION INTRAMUSCULAR; INTRAVENOUS at 02:06
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
## 2025-07-03 ENCOUNTER — HOSPITAL ENCOUNTER (EMERGENCY)
Facility: HOSPITAL | Age: 35
Discharge: HOME | End: 2025-07-03
Attending: EMERGENCY MEDICINE
Payer: COMMERCIAL

## 2025-07-03 VITALS
RESPIRATION RATE: 18 BRPM | WEIGHT: 275 LBS | TEMPERATURE: 98 F | OXYGEN SATURATION: 100 % | HEART RATE: 69 BPM | SYSTOLIC BLOOD PRESSURE: 164 MMHG | DIASTOLIC BLOOD PRESSURE: 103 MMHG | BODY MASS INDEX: 37.3 KG/M2
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
## 2025-07-03 DIAGNOSIS — S39.012A BACK STRAIN, INITIAL ENCOUNTER: Primary | ICD-10-CM

## 2025-07-03 DIAGNOSIS — S20.212A RIB CONTUSION, LEFT, INITIAL ENCOUNTER: ICD-10-CM

## 2025-07-03 PROCEDURE — 25000003 PHARM REV CODE 250: Performed by: EMERGENCY MEDICINE

## 2025-07-03 PROCEDURE — 99283 EMERGENCY DEPT VISIT LOW MDM: CPT | Mod: 25

## 2025-07-03 RX ORDER — LIDOCAINE 50 MG/G
1 PATCH TOPICAL
Status: DISCONTINUED | OUTPATIENT
Start: 2025-07-03 | End: 2025-07-04 | Stop reason: HOSPADM

## 2025-07-03 RX ORDER — METHOCARBAMOL 500 MG/1
500 TABLET, FILM COATED ORAL
Status: COMPLETED | OUTPATIENT
Start: 2025-07-03 | End: 2025-07-03

## 2025-07-03 RX ORDER — AMLODIPINE BESYLATE 5 MG/1
10 TABLET ORAL
Status: COMPLETED | OUTPATIENT
Start: 2025-07-03 | End: 2025-07-03

## 2025-07-03 RX ORDER — ACETAMINOPHEN 500 MG
500 TABLET ORAL
Status: COMPLETED | OUTPATIENT
Start: 2025-07-03 | End: 2025-07-03

## 2025-07-03 RX ORDER — METHOCARBAMOL 500 MG/1
1000 TABLET, FILM COATED ORAL 3 TIMES DAILY
Qty: 30 TABLET | Refills: 0 | Status: SHIPPED | OUTPATIENT
Start: 2025-07-03 | End: 2025-07-08

## 2025-07-03 RX ORDER — ACETAMINOPHEN 500 MG
500 TABLET ORAL EVERY 6 HOURS PRN
Qty: 13 TABLET | Refills: 0 | Status: SHIPPED | OUTPATIENT
Start: 2025-07-03

## 2025-07-03 RX ADMIN — ACETAMINOPHEN 500 MG: 500 TABLET ORAL at 08:07

## 2025-07-03 RX ADMIN — METHOCARBAMOL 500 MG: 500 TABLET ORAL at 08:07

## 2025-07-03 RX ADMIN — AMLODIPINE BESYLATE 10 MG: 5 TABLET ORAL at 09:07

## 2025-07-03 RX ADMIN — LIDOCAINE 1 PATCH: 50 PATCH TOPICAL at 08:07

## 2025-07-03 NOTE — Clinical Note
"Moniearon "Greg" Jasmin was seen and treated in our emergency department on 7/3/2025.  He may return to work on 07/05/2025.       If you have any questions or concerns, please don't hesitate to call.      Abel Nash MD"

## 2025-07-04 NOTE — ED NOTES
Respiratory @ bedside for incentive spirometry teaching. Pt demonstrated how to use device correctly and verbalized understanding of why incentive spirometry use is important after d/c. Pt denied any further questions.

## 2025-07-04 NOTE — DISCHARGE INSTRUCTIONS
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
